# Patient Record
Sex: FEMALE | Race: WHITE | NOT HISPANIC OR LATINO | Employment: OTHER | URBAN - METROPOLITAN AREA
[De-identification: names, ages, dates, MRNs, and addresses within clinical notes are randomized per-mention and may not be internally consistent; named-entity substitution may affect disease eponyms.]

---

## 2017-05-12 ENCOUNTER — HOSPITAL ENCOUNTER (OUTPATIENT)
Dept: GENERAL RADIOLOGY | Facility: HOSPITAL | Age: 62
Discharge: HOME | End: 2017-05-12
Admitting: INTERNAL MEDICINE

## 2017-05-12 ENCOUNTER — TRANSCRIBE ORDERS (OUTPATIENT)
Dept: ADMINISTRATIVE | Facility: HOSPITAL | Age: 62
End: 2017-05-12

## 2017-05-12 ENCOUNTER — LAB (OUTPATIENT)
Dept: LAB | Facility: HOSPITAL | Age: 62
End: 2017-05-12

## 2017-05-12 DIAGNOSIS — J02.9 ACUTE PHARYNGITIS, UNSPECIFIED ETIOLOGY: ICD-10-CM

## 2017-05-12 DIAGNOSIS — R04.2 HEMOPTYSIS: Primary | ICD-10-CM

## 2017-05-12 DIAGNOSIS — J02.9 ACUTE PHARYNGITIS, UNSPECIFIED ETIOLOGY: Primary | ICD-10-CM

## 2017-05-12 DIAGNOSIS — R04.2 HEMOPTYSIS: ICD-10-CM

## 2017-05-12 PROCEDURE — 87070 CULTURE OTHR SPECIMN AEROBIC: CPT | Performed by: INTERNAL MEDICINE

## 2017-05-12 PROCEDURE — 71020 HC CHEST PA AND LATERAL: CPT

## 2017-05-14 LAB — BACTERIA SPEC AEROBE CULT: NORMAL

## 2017-05-19 ENCOUNTER — LAB (OUTPATIENT)
Dept: LAB | Facility: HOSPITAL | Age: 62
End: 2017-05-19

## 2017-05-19 ENCOUNTER — TRANSCRIBE ORDERS (OUTPATIENT)
Dept: ADMINISTRATIVE | Facility: HOSPITAL | Age: 62
End: 2017-05-19

## 2017-05-19 DIAGNOSIS — E78.00 PURE HYPERCHOLESTEROLEMIA: ICD-10-CM

## 2017-05-19 DIAGNOSIS — E73.9 INTESTINAL DISACCHARIDASE DEFICIENCIES AND DISACCHARIDE MALABSORPTION: ICD-10-CM

## 2017-05-19 DIAGNOSIS — I10 ESSENTIAL HYPERTENSION, MALIGNANT: ICD-10-CM

## 2017-05-19 DIAGNOSIS — E73.9 INTESTINAL DISACCHARIDASE DEFICIENCIES AND DISACCHARIDE MALABSORPTION: Primary | ICD-10-CM

## 2017-05-19 LAB
ALBUMIN SERPL-MCNC: 4 G/DL (ref 3.5–5.2)
ALBUMIN/GLOB SERPL: 1.4 G/DL
ALP SERPL-CCNC: 70 U/L (ref 39–117)
ALT SERPL W P-5'-P-CCNC: 15 U/L (ref 1–33)
ANION GAP SERPL CALCULATED.3IONS-SCNC: 8.7 MMOL/L
AST SERPL-CCNC: 17 U/L (ref 1–32)
BILIRUB SERPL-MCNC: 0.3 MG/DL (ref 0.1–1.2)
BUN BLD-MCNC: 19 MG/DL (ref 8–23)
BUN/CREAT SERPL: 21.3 (ref 7–25)
CALCIUM SPEC-SCNC: 9.5 MG/DL (ref 8.6–10.5)
CHLORIDE SERPL-SCNC: 103 MMOL/L (ref 98–107)
CHOLEST SERPL-MCNC: 181 MG/DL (ref 0–200)
CO2 SERPL-SCNC: 29.3 MMOL/L (ref 22–29)
CREAT BLD-MCNC: 0.89 MG/DL (ref 0.57–1)
GFR SERPL CREATININE-BSD FRML MDRD: 64 ML/MIN/1.73
GLOBULIN UR ELPH-MCNC: 2.8 GM/DL
GLUCOSE BLD-MCNC: 87 MG/DL (ref 65–99)
HBA1C MFR BLD: 5.4 % (ref 4.8–5.6)
HDLC SERPL-MCNC: 62 MG/DL (ref 40–60)
LDLC SERPL CALC-MCNC: 108 MG/DL (ref 0–100)
LDLC/HDLC SERPL: 1.74 {RATIO}
POTASSIUM BLD-SCNC: 4.5 MMOL/L (ref 3.5–5.2)
PROT SERPL-MCNC: 6.8 G/DL (ref 6–8.5)
SODIUM BLD-SCNC: 141 MMOL/L (ref 136–145)
TRIGL SERPL-MCNC: 57 MG/DL (ref 0–150)
VLDLC SERPL-MCNC: 11.4 MG/DL (ref 5–40)

## 2017-05-19 PROCEDURE — 80053 COMPREHEN METABOLIC PANEL: CPT | Performed by: INTERNAL MEDICINE

## 2017-05-19 PROCEDURE — 80061 LIPID PANEL: CPT | Performed by: INTERNAL MEDICINE

## 2017-05-19 PROCEDURE — 36415 COLL VENOUS BLD VENIPUNCTURE: CPT

## 2017-05-19 PROCEDURE — 83036 HEMOGLOBIN GLYCOSYLATED A1C: CPT | Performed by: INTERNAL MEDICINE

## 2017-09-20 ENCOUNTER — APPOINTMENT (OUTPATIENT)
Dept: WOMENS IMAGING | Facility: HOSPITAL | Age: 62
End: 2017-09-20

## 2017-09-20 PROCEDURE — 77067 SCR MAMMO BI INCL CAD: CPT | Performed by: RADIOLOGY

## 2017-11-27 ENCOUNTER — LAB (OUTPATIENT)
Dept: LAB | Facility: HOSPITAL | Age: 62
End: 2017-11-27

## 2017-11-27 ENCOUNTER — TRANSCRIBE ORDERS (OUTPATIENT)
Dept: ADMINISTRATIVE | Facility: HOSPITAL | Age: 62
End: 2017-11-27

## 2017-11-27 DIAGNOSIS — I10 ESSENTIAL HYPERTENSION, MALIGNANT: ICD-10-CM

## 2017-11-27 DIAGNOSIS — E78.5 HYPERLIPIDEMIA, UNSPECIFIED HYPERLIPIDEMIA TYPE: ICD-10-CM

## 2017-11-27 DIAGNOSIS — Z00.00 ROUTINE GENERAL MEDICAL EXAMINATION AT A HEALTH CARE FACILITY: ICD-10-CM

## 2017-11-27 DIAGNOSIS — Z00.00 ROUTINE GENERAL MEDICAL EXAMINATION AT A HEALTH CARE FACILITY: Primary | ICD-10-CM

## 2017-11-27 LAB
25(OH)D3 SERPL-MCNC: 34.3 NG/ML (ref 30–100)
ALBUMIN SERPL-MCNC: 4.1 G/DL (ref 3.5–5.2)
ALBUMIN/GLOB SERPL: 1.5 G/DL
ALP SERPL-CCNC: 68 U/L (ref 39–117)
ALT SERPL W P-5'-P-CCNC: 12 U/L (ref 1–33)
ANION GAP SERPL CALCULATED.3IONS-SCNC: 10 MMOL/L
AST SERPL-CCNC: 14 U/L (ref 1–32)
BACTERIA UR QL AUTO: NORMAL /HPF
BASOPHILS # BLD AUTO: 0.03 10*3/MM3 (ref 0–0.2)
BASOPHILS NFR BLD AUTO: 0.4 % (ref 0–1.5)
BILIRUB SERPL-MCNC: 0.3 MG/DL (ref 0.1–1.2)
BILIRUB UR QL STRIP: NEGATIVE
BUN BLD-MCNC: 19 MG/DL (ref 8–23)
BUN/CREAT SERPL: 21.8 (ref 7–25)
CALCIUM SPEC-SCNC: 9.3 MG/DL (ref 8.6–10.5)
CHLORIDE SERPL-SCNC: 103 MMOL/L (ref 98–107)
CHOLEST SERPL-MCNC: 176 MG/DL (ref 0–200)
CLARITY UR: CLEAR
CO2 SERPL-SCNC: 27 MMOL/L (ref 22–29)
COLOR UR: YELLOW
CREAT BLD-MCNC: 0.87 MG/DL (ref 0.57–1)
DEPRECATED RDW RBC AUTO: 41.8 FL (ref 37–54)
EOSINOPHIL # BLD AUTO: 0.24 10*3/MM3 (ref 0–0.7)
EOSINOPHIL NFR BLD AUTO: 3.4 % (ref 0.3–6.2)
ERYTHROCYTE [DISTWIDTH] IN BLOOD BY AUTOMATED COUNT: 12.3 % (ref 11.7–13)
GFR SERPL CREATININE-BSD FRML MDRD: 66 ML/MIN/1.73
GLOBULIN UR ELPH-MCNC: 2.8 GM/DL
GLUCOSE BLD-MCNC: 86 MG/DL (ref 65–99)
GLUCOSE UR STRIP-MCNC: NEGATIVE MG/DL
HCT VFR BLD AUTO: 36.2 % (ref 35.6–45.5)
HDLC SERPL-MCNC: 71 MG/DL (ref 40–60)
HGB BLD-MCNC: 11.7 G/DL (ref 11.9–15.5)
HGB UR QL STRIP.AUTO: ABNORMAL
HYALINE CASTS UR QL AUTO: NORMAL /LPF
IMM GRANULOCYTES # BLD: 0.02 10*3/MM3 (ref 0–0.03)
IMM GRANULOCYTES NFR BLD: 0.3 % (ref 0–0.5)
KETONES UR QL STRIP: NEGATIVE
LDLC SERPL CALC-MCNC: 95 MG/DL (ref 0–100)
LDLC/HDLC SERPL: 1.34 {RATIO}
LEUKOCYTE ESTERASE UR QL STRIP.AUTO: NEGATIVE
LYMPHOCYTES # BLD AUTO: 2.31 10*3/MM3 (ref 0.9–4.8)
LYMPHOCYTES NFR BLD AUTO: 33.2 % (ref 19.6–45.3)
MCH RBC QN AUTO: 30.2 PG (ref 26.9–32)
MCHC RBC AUTO-ENTMCNC: 32.3 G/DL (ref 32.4–36.3)
MCV RBC AUTO: 93.3 FL (ref 80.5–98.2)
MONOCYTES # BLD AUTO: 0.6 10*3/MM3 (ref 0.2–1.2)
MONOCYTES NFR BLD AUTO: 8.6 % (ref 5–12)
NEUTROPHILS # BLD AUTO: 3.76 10*3/MM3 (ref 1.9–8.1)
NEUTROPHILS NFR BLD AUTO: 54.1 % (ref 42.7–76)
NITRITE UR QL STRIP: NEGATIVE
PH UR STRIP.AUTO: 6.5 [PH] (ref 5–8)
PLATELET # BLD AUTO: 301 10*3/MM3 (ref 140–500)
PMV BLD AUTO: 9.8 FL (ref 6–12)
POTASSIUM BLD-SCNC: 4.8 MMOL/L (ref 3.5–5.2)
PROT SERPL-MCNC: 6.9 G/DL (ref 6–8.5)
PROT UR QL STRIP: NEGATIVE
RBC # BLD AUTO: 3.88 10*6/MM3 (ref 3.9–5.2)
RBC # UR: NORMAL /HPF
REF LAB TEST METHOD: NORMAL
SODIUM BLD-SCNC: 140 MMOL/L (ref 136–145)
SP GR UR STRIP: 1.02 (ref 1–1.03)
SQUAMOUS #/AREA URNS HPF: NORMAL /HPF
TRIGL SERPL-MCNC: 50 MG/DL (ref 0–150)
TSH SERPL DL<=0.05 MIU/L-ACNC: 1.55 MIU/ML (ref 0.27–4.2)
UROBILINOGEN UR QL STRIP: ABNORMAL
VLDLC SERPL-MCNC: 10 MG/DL (ref 5–40)
WBC NRBC COR # BLD: 6.96 10*3/MM3 (ref 4.5–10.7)
WBC UR QL AUTO: NORMAL /HPF

## 2017-11-27 PROCEDURE — 85025 COMPLETE CBC W/AUTO DIFF WBC: CPT | Performed by: INTERNAL MEDICINE

## 2017-11-27 PROCEDURE — 36415 COLL VENOUS BLD VENIPUNCTURE: CPT

## 2017-11-27 PROCEDURE — 81001 URINALYSIS AUTO W/SCOPE: CPT | Performed by: INTERNAL MEDICINE

## 2017-11-27 PROCEDURE — 80061 LIPID PANEL: CPT | Performed by: INTERNAL MEDICINE

## 2017-11-27 PROCEDURE — 82306 VITAMIN D 25 HYDROXY: CPT | Performed by: INTERNAL MEDICINE

## 2017-11-27 PROCEDURE — 80053 COMPREHEN METABOLIC PANEL: CPT | Performed by: INTERNAL MEDICINE

## 2017-11-27 PROCEDURE — 84443 ASSAY THYROID STIM HORMONE: CPT | Performed by: INTERNAL MEDICINE

## 2018-02-26 ENCOUNTER — HOSPITAL ENCOUNTER (OUTPATIENT)
Dept: OTHER | Age: 63
Discharge: HOME | End: 2018-02-26

## 2018-02-26 LAB
BASOPHILS ABSOLUTE: 0.1 K/UL (ref 0–0.1)
BASOPHILS RELATIVE PERCENT: 0.7 %
EOSINOPHILS ABSOLUTE: 0.1 K/UL (ref 0–0.4)
EOSINOPHILS RELATIVE PERCENT: 1.4 %
FERRITIN: 140.2 NG/ML (ref 22–322)
FOLATE: 13.6 NG/ML
HCT VFR BLD CALC: 35.1 % (ref 37–47)
HEMOGLOBIN: 11.4 G/DL (ref 11.5–16.5)
IMMATURE GRANULOCYTES #: 0 K/UL
IMMATURE GRANULOCYTES %: 0.1 % (ref 0–5)
IMMATURE RETIC FRACT: 0.37 (ref 0.21–0.37)
LYMPHOCYTES ABSOLUTE: 1.9 K/UL (ref 1.5–4)
LYMPHOCYTES RELATIVE PERCENT: 27.1 %
MCH RBC QN AUTO: 30.2 PG (ref 27–32)
MCHC RBC AUTO-ENTMCNC: 32.5 G/DL (ref 31–35)
MCV RBC AUTO: 92.9 FL (ref 80–100)
MONOCYTES ABSOLUTE: 0.6 K/UL (ref 0.2–0.8)
MONOCYTES RELATIVE PERCENT: 8.1 %
NEUTROPHILS ABSOLUTE: 4.3 K/UL (ref 2–7.5)
NEUTROPHILS RELATIVE PERCENT: 62.6 %
PDW BLD-RTO: 11.9 % (ref 11–16)
PLATELET # BLD: 294 K/UL (ref 150–400)
PMV BLD AUTO: 9.2 FL (ref 6–10)
RBC # BLD: 3.78 M/UL (ref 3.8–5.8)
RETICULOCYTE COUNT PCT: 1.15 % (ref 0.5–2.18)
TOTAL IRON BINDING CAPACITY: 237 UG/DL (ref 250–450)
WBC # BLD: 6.9 K/UL (ref 4–11)

## 2018-02-27 LAB — VITAMIN B-12: 755 PG/ML (ref 211–911)

## 2018-06-04 ENCOUNTER — LAB (OUTPATIENT)
Dept: LAB | Facility: HOSPITAL | Age: 63
End: 2018-06-04

## 2018-06-04 ENCOUNTER — TRANSCRIBE ORDERS (OUTPATIENT)
Dept: ADMINISTRATIVE | Facility: HOSPITAL | Age: 63
End: 2018-06-04

## 2018-06-04 DIAGNOSIS — D64.9 ANEMIA, UNSPECIFIED TYPE: ICD-10-CM

## 2018-06-04 DIAGNOSIS — I10 ESSENTIAL HYPERTENSION, MALIGNANT: ICD-10-CM

## 2018-06-04 DIAGNOSIS — D64.9 ANEMIA, UNSPECIFIED TYPE: Primary | ICD-10-CM

## 2018-06-04 LAB
ANION GAP SERPL CALCULATED.3IONS-SCNC: 10.8 MMOL/L
BASOPHILS # BLD AUTO: 0.03 10*3/MM3 (ref 0–0.2)
BASOPHILS NFR BLD AUTO: 0.4 % (ref 0–1.5)
BUN BLD-MCNC: 15 MG/DL (ref 8–23)
BUN/CREAT SERPL: 14.7 (ref 7–25)
CALCIUM SPEC-SCNC: 10 MG/DL (ref 8.6–10.5)
CHLORIDE SERPL-SCNC: 99 MMOL/L (ref 98–107)
CO2 SERPL-SCNC: 30.2 MMOL/L (ref 22–29)
CREAT BLD-MCNC: 1.02 MG/DL (ref 0.57–1)
DEPRECATED RDW RBC AUTO: 40.4 FL (ref 37–54)
EOSINOPHIL # BLD AUTO: 0.25 10*3/MM3 (ref 0–0.7)
EOSINOPHIL NFR BLD AUTO: 3.5 % (ref 0.3–6.2)
ERYTHROCYTE [DISTWIDTH] IN BLOOD BY AUTOMATED COUNT: 12 % (ref 11.7–13)
GFR SERPL CREATININE-BSD FRML MDRD: 55 ML/MIN/1.73
GLUCOSE BLD-MCNC: 90 MG/DL (ref 65–99)
HCT VFR BLD AUTO: 39.9 % (ref 35.6–45.5)
HGB BLD-MCNC: 13 G/DL (ref 11.9–15.5)
IMM GRANULOCYTES # BLD: 0.02 10*3/MM3 (ref 0–0.03)
IMM GRANULOCYTES NFR BLD: 0.3 % (ref 0–0.5)
LYMPHOCYTES # BLD AUTO: 2.23 10*3/MM3 (ref 0.9–4.8)
LYMPHOCYTES NFR BLD AUTO: 31.2 % (ref 19.6–45.3)
MCH RBC QN AUTO: 30.4 PG (ref 26.9–32)
MCHC RBC AUTO-ENTMCNC: 32.6 G/DL (ref 32.4–36.3)
MCV RBC AUTO: 93.2 FL (ref 80.5–98.2)
MONOCYTES # BLD AUTO: 0.6 10*3/MM3 (ref 0.2–1.2)
MONOCYTES NFR BLD AUTO: 8.4 % (ref 5–12)
NEUTROPHILS # BLD AUTO: 4.04 10*3/MM3 (ref 1.9–8.1)
NEUTROPHILS NFR BLD AUTO: 56.5 % (ref 42.7–76)
PLATELET # BLD AUTO: 306 10*3/MM3 (ref 140–500)
PMV BLD AUTO: 9.7 FL (ref 6–12)
POTASSIUM BLD-SCNC: 4.4 MMOL/L (ref 3.5–5.2)
RBC # BLD AUTO: 4.28 10*6/MM3 (ref 3.9–5.2)
SODIUM BLD-SCNC: 140 MMOL/L (ref 136–145)
WBC NRBC COR # BLD: 7.15 10*3/MM3 (ref 4.5–10.7)

## 2018-06-04 PROCEDURE — 85025 COMPLETE CBC W/AUTO DIFF WBC: CPT | Performed by: INTERNAL MEDICINE

## 2018-06-04 PROCEDURE — 80048 BASIC METABOLIC PNL TOTAL CA: CPT | Performed by: INTERNAL MEDICINE

## 2018-06-04 PROCEDURE — 36415 COLL VENOUS BLD VENIPUNCTURE: CPT

## 2018-07-09 ENCOUNTER — TRANSCRIBE ORDERS (OUTPATIENT)
Dept: ADMINISTRATIVE | Facility: HOSPITAL | Age: 63
End: 2018-07-09

## 2018-07-09 ENCOUNTER — HOSPITAL ENCOUNTER (OUTPATIENT)
Dept: GENERAL RADIOLOGY | Facility: HOSPITAL | Age: 63
Discharge: HOME | End: 2018-07-09
Admitting: INTERNAL MEDICINE

## 2018-07-09 DIAGNOSIS — M54.5 LOW BACK PAIN, UNSPECIFIED BACK PAIN LATERALITY, UNSPECIFIED CHRONICITY, WITH SCIATICA PRESENCE UNSPECIFIED: ICD-10-CM

## 2018-07-09 DIAGNOSIS — M54.10 RADICULOPATHY, UNSPECIFIED SPINAL REGION: ICD-10-CM

## 2018-07-09 DIAGNOSIS — M54.5 LOW BACK PAIN, UNSPECIFIED BACK PAIN LATERALITY, UNSPECIFIED CHRONICITY, WITH SCIATICA PRESENCE UNSPECIFIED: Primary | ICD-10-CM

## 2018-07-09 PROCEDURE — 72110 X-RAY EXAM L-2 SPINE 4/>VWS: CPT

## 2018-09-24 ENCOUNTER — APPOINTMENT (OUTPATIENT)
Dept: WOMENS IMAGING | Facility: HOSPITAL | Age: 63
End: 2018-09-24

## 2018-09-24 PROCEDURE — 77067 SCR MAMMO BI INCL CAD: CPT | Performed by: RADIOLOGY

## 2018-09-24 PROCEDURE — 77063 BREAST TOMOSYNTHESIS BI: CPT | Performed by: RADIOLOGY

## 2018-12-03 ENCOUNTER — LAB (OUTPATIENT)
Dept: LAB | Facility: HOSPITAL | Age: 63
End: 2018-12-03

## 2018-12-03 ENCOUNTER — TRANSCRIBE ORDERS (OUTPATIENT)
Dept: ADMINISTRATIVE | Facility: HOSPITAL | Age: 63
End: 2018-12-03

## 2018-12-03 DIAGNOSIS — I10 ESSENTIAL HYPERTENSION, MALIGNANT: ICD-10-CM

## 2018-12-03 DIAGNOSIS — E55.9 VITAMIN D DEFICIENCY: ICD-10-CM

## 2018-12-03 DIAGNOSIS — R53.83 OTHER FATIGUE: ICD-10-CM

## 2018-12-03 DIAGNOSIS — Z00.00 ROUTINE GENERAL MEDICAL EXAMINATION AT A HEALTH CARE FACILITY: Primary | ICD-10-CM

## 2018-12-03 DIAGNOSIS — Z00.00 ROUTINE GENERAL MEDICAL EXAMINATION AT A HEALTH CARE FACILITY: ICD-10-CM

## 2018-12-03 LAB
25(OH)D3 SERPL-MCNC: 32.6 NG/ML (ref 30–100)
ALBUMIN SERPL-MCNC: 4.2 G/DL (ref 3.5–5.2)
ALBUMIN/GLOB SERPL: 1.8 G/DL
ALP SERPL-CCNC: 77 U/L (ref 39–117)
ALT SERPL W P-5'-P-CCNC: 17 U/L (ref 1–33)
ANION GAP SERPL CALCULATED.3IONS-SCNC: 8.4 MMOL/L
AST SERPL-CCNC: 16 U/L (ref 1–32)
BACTERIA UR QL AUTO: ABNORMAL /HPF
BASOPHILS # BLD AUTO: 0.04 10*3/MM3 (ref 0–0.2)
BASOPHILS NFR BLD AUTO: 0.5 % (ref 0–1.5)
BILIRUB SERPL-MCNC: 0.3 MG/DL (ref 0.1–1.2)
BILIRUB UR QL STRIP: NEGATIVE
BUN BLD-MCNC: 16 MG/DL (ref 8–23)
BUN/CREAT SERPL: 19.5 (ref 7–25)
CALCIUM SPEC-SCNC: 9.4 MG/DL (ref 8.6–10.5)
CHLORIDE SERPL-SCNC: 102 MMOL/L (ref 98–107)
CHOLEST SERPL-MCNC: 194 MG/DL (ref 0–200)
CLARITY UR: CLEAR
CO2 SERPL-SCNC: 27.6 MMOL/L (ref 22–29)
COLOR UR: YELLOW
CREAT BLD-MCNC: 0.82 MG/DL (ref 0.57–1)
DEPRECATED RDW RBC AUTO: 42.2 FL (ref 37–54)
EOSINOPHIL # BLD AUTO: 0.18 10*3/MM3 (ref 0–0.7)
EOSINOPHIL NFR BLD AUTO: 2.4 % (ref 0.3–6.2)
ERYTHROCYTE [DISTWIDTH] IN BLOOD BY AUTOMATED COUNT: 12.2 % (ref 11.7–13)
GFR SERPL CREATININE-BSD FRML MDRD: 70 ML/MIN/1.73
GLOBULIN UR ELPH-MCNC: 2.3 GM/DL
GLUCOSE BLD-MCNC: 92 MG/DL (ref 65–99)
GLUCOSE UR STRIP-MCNC: NEGATIVE MG/DL
HCT VFR BLD AUTO: 37.5 % (ref 35.6–45.5)
HDLC SERPL-MCNC: 64 MG/DL (ref 40–60)
HGB BLD-MCNC: 11.9 G/DL (ref 11.9–15.5)
HGB UR QL STRIP.AUTO: ABNORMAL
HYALINE CASTS UR QL AUTO: ABNORMAL /LPF
IMM GRANULOCYTES # BLD: 0 10*3/MM3 (ref 0–0.03)
IMM GRANULOCYTES NFR BLD: 0 % (ref 0–0.5)
KETONES UR QL STRIP: ABNORMAL
LDLC SERPL CALC-MCNC: 117 MG/DL (ref 0–100)
LDLC/HDLC SERPL: 1.82 {RATIO}
LEUKOCYTE ESTERASE UR QL STRIP.AUTO: NEGATIVE
LYMPHOCYTES # BLD AUTO: 2.37 10*3/MM3 (ref 0.9–4.8)
LYMPHOCYTES NFR BLD AUTO: 31.9 % (ref 19.6–45.3)
MCH RBC QN AUTO: 30.3 PG (ref 26.9–32)
MCHC RBC AUTO-ENTMCNC: 31.7 G/DL (ref 32.4–36.3)
MCV RBC AUTO: 95.4 FL (ref 80.5–98.2)
MONOCYTES # BLD AUTO: 0.6 10*3/MM3 (ref 0.2–1.2)
MONOCYTES NFR BLD AUTO: 8.1 % (ref 5–12)
NEUTROPHILS # BLD AUTO: 4.23 10*3/MM3 (ref 1.9–8.1)
NEUTROPHILS NFR BLD AUTO: 57.1 % (ref 42.7–76)
NITRITE UR QL STRIP: NEGATIVE
PH UR STRIP.AUTO: 5.5 [PH] (ref 5–8)
PLATELET # BLD AUTO: 310 10*3/MM3 (ref 140–500)
PMV BLD AUTO: 9.4 FL (ref 6–12)
POTASSIUM BLD-SCNC: 4.4 MMOL/L (ref 3.5–5.2)
PROT SERPL-MCNC: 6.5 G/DL (ref 6–8.5)
PROT UR QL STRIP: NEGATIVE
RBC # BLD AUTO: 3.93 10*6/MM3 (ref 3.9–5.2)
RBC # UR: ABNORMAL /HPF
REF LAB TEST METHOD: ABNORMAL
SODIUM BLD-SCNC: 138 MMOL/L (ref 136–145)
SP GR UR STRIP: 1.02 (ref 1–1.03)
SQUAMOUS #/AREA URNS HPF: ABNORMAL /HPF
TRIGL SERPL-MCNC: 67 MG/DL (ref 0–150)
TSH SERPL DL<=0.05 MIU/L-ACNC: 1.8 MIU/ML (ref 0.27–4.2)
UROBILINOGEN UR QL STRIP: ABNORMAL
VLDLC SERPL-MCNC: 13.4 MG/DL (ref 5–40)
WBC NRBC COR # BLD: 7.42 10*3/MM3 (ref 4.5–10.7)
WBC UR QL AUTO: ABNORMAL /HPF

## 2018-12-03 PROCEDURE — 84443 ASSAY THYROID STIM HORMONE: CPT | Performed by: INTERNAL MEDICINE

## 2018-12-03 PROCEDURE — 82306 VITAMIN D 25 HYDROXY: CPT | Performed by: INTERNAL MEDICINE

## 2018-12-03 PROCEDURE — 80061 LIPID PANEL: CPT | Performed by: INTERNAL MEDICINE

## 2018-12-03 PROCEDURE — 85025 COMPLETE CBC W/AUTO DIFF WBC: CPT | Performed by: INTERNAL MEDICINE

## 2018-12-03 PROCEDURE — 36415 COLL VENOUS BLD VENIPUNCTURE: CPT

## 2018-12-03 PROCEDURE — 80053 COMPREHEN METABOLIC PANEL: CPT | Performed by: INTERNAL MEDICINE

## 2018-12-03 PROCEDURE — 81001 URINALYSIS AUTO W/SCOPE: CPT | Performed by: INTERNAL MEDICINE

## 2019-05-30 ENCOUNTER — LAB (OUTPATIENT)
Dept: LAB | Facility: HOSPITAL | Age: 64
End: 2019-05-30

## 2019-05-30 ENCOUNTER — TRANSCRIBE ORDERS (OUTPATIENT)
Dept: ADMINISTRATIVE | Facility: HOSPITAL | Age: 64
End: 2019-05-30

## 2019-05-30 DIAGNOSIS — I10 ESSENTIAL HYPERTENSION, MALIGNANT: ICD-10-CM

## 2019-05-30 DIAGNOSIS — E78.5 HYPERLIPIDEMIA, UNSPECIFIED HYPERLIPIDEMIA TYPE: ICD-10-CM

## 2019-05-30 DIAGNOSIS — I10 ESSENTIAL HYPERTENSION, MALIGNANT: Primary | ICD-10-CM

## 2019-05-30 LAB
ALBUMIN SERPL-MCNC: 4.3 G/DL (ref 3.5–5.2)
ALBUMIN/GLOB SERPL: 2 G/DL
ALP SERPL-CCNC: 72 U/L (ref 39–117)
ALT SERPL W P-5'-P-CCNC: 14 U/L (ref 1–33)
ANION GAP SERPL CALCULATED.3IONS-SCNC: 9.1 MMOL/L
AST SERPL-CCNC: 16 U/L (ref 1–32)
BILIRUB SERPL-MCNC: 0.2 MG/DL (ref 0.2–1.2)
BUN BLD-MCNC: 18 MG/DL (ref 8–23)
BUN/CREAT SERPL: 20.2 (ref 7–25)
CALCIUM SPEC-SCNC: 9.4 MG/DL (ref 8.6–10.5)
CHLORIDE SERPL-SCNC: 102 MMOL/L (ref 98–107)
CHOLEST SERPL-MCNC: 195 MG/DL (ref 0–200)
CO2 SERPL-SCNC: 27.9 MMOL/L (ref 22–29)
CREAT BLD-MCNC: 0.89 MG/DL (ref 0.57–1)
GFR SERPL CREATININE-BSD FRML MDRD: 64 ML/MIN/1.73
GLOBULIN UR ELPH-MCNC: 2.2 GM/DL
GLUCOSE BLD-MCNC: 91 MG/DL (ref 65–99)
HDLC SERPL-MCNC: 74 MG/DL (ref 40–60)
LDLC SERPL CALC-MCNC: 110 MG/DL (ref 0–100)
LDLC/HDLC SERPL: 1.49 {RATIO}
POTASSIUM BLD-SCNC: 4.2 MMOL/L (ref 3.5–5.2)
PROT SERPL-MCNC: 6.5 G/DL (ref 6–8.5)
SODIUM BLD-SCNC: 139 MMOL/L (ref 136–145)
TRIGL SERPL-MCNC: 54 MG/DL (ref 0–150)
VLDLC SERPL-MCNC: 10.8 MG/DL (ref 5–40)

## 2019-05-30 PROCEDURE — 36415 COLL VENOUS BLD VENIPUNCTURE: CPT

## 2019-05-30 PROCEDURE — 80053 COMPREHEN METABOLIC PANEL: CPT | Performed by: INTERNAL MEDICINE

## 2019-05-30 PROCEDURE — 80061 LIPID PANEL: CPT | Performed by: INTERNAL MEDICINE

## 2019-06-10 ENCOUNTER — LAB (OUTPATIENT)
Dept: LAB | Facility: HOSPITAL | Age: 64
End: 2019-06-10

## 2019-06-10 ENCOUNTER — TRANSCRIBE ORDERS (OUTPATIENT)
Dept: ADMINISTRATIVE | Facility: HOSPITAL | Age: 64
End: 2019-06-10

## 2019-06-10 DIAGNOSIS — G25.0 ESSENTIAL TREMOR: Primary | ICD-10-CM

## 2019-06-10 DIAGNOSIS — G25.0 ESSENTIAL TREMOR: ICD-10-CM

## 2019-06-10 LAB
T3 SERPL-MCNC: 60.8 NG/DL (ref 80–200)
T4 SERPL-MCNC: 2.95 MCG/DL (ref 4.5–11.7)
TSH SERPL DL<=0.05 MIU/L-ACNC: 1.88 MIU/ML (ref 0.27–4.2)

## 2019-06-10 PROCEDURE — 84436 ASSAY OF TOTAL THYROXINE: CPT | Performed by: INTERNAL MEDICINE

## 2019-06-10 PROCEDURE — 84443 ASSAY THYROID STIM HORMONE: CPT | Performed by: INTERNAL MEDICINE

## 2019-06-10 PROCEDURE — 84480 ASSAY TRIIODOTHYRONINE (T3): CPT | Performed by: INTERNAL MEDICINE

## 2019-06-10 PROCEDURE — 36415 COLL VENOUS BLD VENIPUNCTURE: CPT

## 2019-08-12 ENCOUNTER — LAB (OUTPATIENT)
Dept: LAB | Facility: HOSPITAL | Age: 64
End: 2019-08-12

## 2019-08-12 ENCOUNTER — TRANSCRIBE ORDERS (OUTPATIENT)
Dept: ADMINISTRATIVE | Facility: HOSPITAL | Age: 64
End: 2019-08-12

## 2019-08-12 DIAGNOSIS — E03.9 HYPOTHYROIDISM, UNSPECIFIED TYPE: Primary | ICD-10-CM

## 2019-08-12 DIAGNOSIS — E03.9 HYPOTHYROIDISM, UNSPECIFIED TYPE: ICD-10-CM

## 2019-08-12 LAB
T3 SERPL-MCNC: 62.6 NG/DL (ref 80–200)
T4 SERPL-MCNC: 3.18 MCG/DL (ref 4.5–11.7)
TSH SERPL DL<=0.05 MIU/L-ACNC: 2.23 MIU/ML (ref 0.27–4.2)

## 2019-08-12 PROCEDURE — 36415 COLL VENOUS BLD VENIPUNCTURE: CPT

## 2019-08-12 PROCEDURE — 84436 ASSAY OF TOTAL THYROXINE: CPT | Performed by: INTERNAL MEDICINE

## 2019-08-12 PROCEDURE — 84480 ASSAY TRIIODOTHYRONINE (T3): CPT | Performed by: INTERNAL MEDICINE

## 2019-08-12 PROCEDURE — 84443 ASSAY THYROID STIM HORMONE: CPT | Performed by: INTERNAL MEDICINE

## 2019-10-07 ENCOUNTER — OFFICE VISIT (OUTPATIENT)
Dept: ENDOCRINOLOGY | Age: 64
End: 2019-10-07

## 2019-10-07 VITALS
SYSTOLIC BLOOD PRESSURE: 126 MMHG | DIASTOLIC BLOOD PRESSURE: 74 MMHG | HEART RATE: 66 BPM | BODY MASS INDEX: 32.59 KG/M2 | OXYGEN SATURATION: 94 % | WEIGHT: 166 LBS | HEIGHT: 60 IN

## 2019-10-07 DIAGNOSIS — E03.9 ACQUIRED HYPOTHYROIDISM: Primary | ICD-10-CM

## 2019-10-07 PROCEDURE — 99204 OFFICE O/P NEW MOD 45 MIN: CPT | Performed by: INTERNAL MEDICINE

## 2019-10-07 RX ORDER — FLUTICASONE PROPIONATE 50 MCG
2 SPRAY, SUSPENSION (ML) NASAL DAILY
COMMUNITY

## 2019-10-07 RX ORDER — LANOLIN ALCOHOL/MO/W.PET/CERES
400 CREAM (GRAM) TOPICAL DAILY
COMMUNITY

## 2019-10-07 RX ORDER — IRBESARTAN 75 MG/1
TABLET ORAL
COMMUNITY
Start: 2019-09-28

## 2019-10-07 RX ORDER — AMPICILLIN TRIHYDRATE 250 MG
CAPSULE ORAL
COMMUNITY

## 2019-10-07 RX ORDER — SUMATRIPTAN 100 MG/1
TABLET, FILM COATED ORAL
COMMUNITY
Start: 2019-08-19

## 2019-10-07 RX ORDER — ZOLPIDEM TARTRATE 10 MG/1
TABLET ORAL
COMMUNITY
End: 2020-09-14

## 2019-10-07 RX ORDER — VERAPAMIL HYDROCHLORIDE 240 MG/1
TABLET, FILM COATED, EXTENDED RELEASE ORAL
COMMUNITY
Start: 2019-08-03

## 2019-10-07 RX ORDER — CHLORAL HYDRATE 500 MG
CAPSULE ORAL
COMMUNITY

## 2019-10-07 NOTE — PROGRESS NOTES
Chief Complaint   Patient presents with   • Abnormal Lab   NEW PATIENT APPOINTMENT/  ABNORMAL THYROID LABS    HPI  Aracelis Arechiga,64 y.o. WF is here as a new pt is here as abnormal thyroid labs. Consulted by Dr. Lopez.   Patient's thyroid levels were checked by her primary care physician because of her complaints of tremors.  Patient's TSH levels have been normal for the last 2 to 3 years but when her anti-thyroid panel was checked with total T4 and total T3 her levels were noted to be low.    Repeat levels showed consistent results.    The interim patient also was seen by the neurologist who felt that the tremors were essential tremors.  Which is currently being monitored by the neurologist.  Not on any medication for this.    Today in the clinic patient reports that her energy levels are decent, significant improvement with her sleep getting better on the half a pill of Ambien.  Weight has been stable.  No significant hypo-or hyperthyroid symptoms  Essential tremors.  No family history of thyroid disease that she knows of.  Questionable history of thyroid problem in her brother who passed away.    She is in menopause, no kids of her own.    Reviewed primary care physician's/consulting physician documentation and lab results :     I have reviewed the patient's allergies, medicines, past medical hx, family hx and social hx in detail.    Past Medical History:   Diagnosis Date   • Hypertension    • Insomnia    • Migraines        Family History   Problem Relation Age of Onset   • Macular degeneration Mother    • Heart failure Father        Social History     Socioeconomic History   • Marital status:      Spouse name: Not on file   • Number of children: Not on file   • Years of education: Not on file   • Highest education level: Not on file       No Known Allergies      Current Outpatient Medications:   •  Calcium Carbonate (CALCIUM 600) 1500 (600 Ca) MG tablet, Take 600 mg by mouth Daily., Disp: , Rfl:   •   "Coenzyme Q10 (COQ10) 200 MG capsule, Take  by mouth., Disp: , Rfl:   •  fluticasone (FLONASE) 50 MCG/ACT nasal spray, 2 sprays into the nostril(s) as directed by provider Daily., Disp: , Rfl:   •  folic acid (FOLVITE) 400 MCG tablet, Take 400 mcg by mouth Daily., Disp: , Rfl:   •  irbesartan (AVAPRO) 75 MG tablet, , Disp: , Rfl:   •  Misc Natural Products (LUTEIN 20 PO), Take  by mouth., Disp: , Rfl:   •  Omega-3 Fatty Acids (FISH OIL) 1000 MG capsule capsule, Take  by mouth Daily With Breakfast., Disp: , Rfl:   •  SUMAtriptan (IMITREX) 100 MG tablet, , Disp: , Rfl:   •  verapamil SR (CALAN-SR) 240 MG CR tablet, , Disp: , Rfl:   •  zolpidem (AMBIEN) 10 MG tablet, , Disp: , Rfl:      Review of Systems   Constitutional: Negative for appetite change, fatigue and fever.   Eyes: Negative for visual disturbance.   Respiratory: Negative for shortness of breath.    Cardiovascular: Negative for palpitations and leg swelling.   Gastrointestinal: Negative for abdominal pain and vomiting.   Endocrine: Negative for polydipsia and polyuria.   Musculoskeletal: Negative for joint swelling and neck pain.   Skin: Negative for rash.   Neurological: Negative for weakness and numbness.   Psychiatric/Behavioral: Negative for behavioral problems.       Objective:    /74   Pulse 66   Ht 152.4 cm (60\")   Wt 75.3 kg (166 lb)   SpO2 94%   BMI 32.42 kg/m²     Physical Exam   Constitutional: She is oriented to person, place, and time. She appears well-nourished.   HENT:   Head: Normocephalic and atraumatic.   Eyes: Conjunctivae and EOM are normal. No scleral icterus.   Neck: Normal range of motion. Neck supple. No thyromegaly present.   Cardiovascular: Normal rate and normal heart sounds. Exam reveals no friction rub.   No murmur heard.  Pulmonary/Chest: Effort normal and breath sounds normal. No stridor. She has no wheezes. She has no rales.   Abdominal: Soft. Bowel sounds are normal. She exhibits no distension. There is no " "tenderness.   Musculoskeletal: She exhibits no edema or tenderness.   Lymphadenopathy:     She has no cervical adenopathy.   Neurological: She is alert and oriented to person, place, and time.   Skin: Skin is warm and dry. She is not diaphoretic.   Psychiatric: She has a normal mood and affect.   Vitals reviewed.      Results Review:    I reviewed the patient's new clinical results.    Lab on 08/12/2019   Component Date Value Ref Range Status   • T3, Total 08/12/2019 62.6* 80.0 - 200.0 ng/dl Final   • T4, Total 08/12/2019 3.18* 4.50 - 11.70 mcg/dL Final   • TSH 08/12/2019 2.230  0.270 - 4.200 mIU/mL Final       Aracelis was seen today for abnormal lab.    Diagnoses and all orders for this visit:    Acquired hypothyroidism  -     TSH  -     T4, Free  -     T3, Free  -     Thyroid Peroxidase Antibody  -     TSH; Future  -     T4, Free; Future  -     T3, Free; Future      Hypothyroidism  Levels are abnormal  Explained to the patient that we will check free T4 and free T3 which are more definitive than the total T4 and total T3 which could be influenced by the thyroid binding immunoglobulin and give a falsely low results.    However if the free T4 and free T3 are still low we will proceed with a thyroid replacement for the patient.  Would consider Synthroid  We will repeat the thyroid panel in 6 weeks from now on the medication.    Hyperlipidemia  LDL is elevated  Discussed about statins with the patient, she would want to work on her dietary restrictions at this time.    Fasting blood sugar was within normal limits.  Thank you for asking me to see your patient Aracelis Arechiga in consultation.        Dannie Bryan MD  10/07/19    EMR Dragon / transcription disclaimer:     \"Dictated utilizing Dragon dictation\".         "

## 2019-10-08 LAB
T3FREE SERPL-MCNC: 2.5 PG/ML (ref 2–4.4)
T4 FREE SERPL-MCNC: 0.93 NG/DL (ref 0.93–1.7)
THYROPEROXIDASE AB SERPL-ACNC: 6 IU/ML (ref 0–34)
TSH SERPL DL<=0.005 MIU/L-ACNC: 2.08 UIU/ML (ref 0.27–4.2)

## 2019-10-08 NOTE — PROGRESS NOTES
Please let the patient know that her thyroid levels are absolutely within normal limits.  Free T4 and free T3 are normal.  Would repeat her labs during her next visit.

## 2019-12-05 ENCOUNTER — TRANSCRIBE ORDERS (OUTPATIENT)
Dept: ADMINISTRATIVE | Facility: HOSPITAL | Age: 64
End: 2019-12-05

## 2019-12-05 ENCOUNTER — LAB (OUTPATIENT)
Dept: LAB | Facility: HOSPITAL | Age: 64
End: 2019-12-05

## 2019-12-05 DIAGNOSIS — I10 ESSENTIAL HYPERTENSION, MALIGNANT: ICD-10-CM

## 2019-12-05 DIAGNOSIS — E78.5 HYPERLIPIDEMIA, UNSPECIFIED HYPERLIPIDEMIA TYPE: ICD-10-CM

## 2019-12-05 DIAGNOSIS — Z00.00 ROUTINE GENERAL MEDICAL EXAMINATION AT A HEALTH CARE FACILITY: Primary | ICD-10-CM

## 2019-12-05 DIAGNOSIS — Z00.00 ROUTINE GENERAL MEDICAL EXAMINATION AT A HEALTH CARE FACILITY: ICD-10-CM

## 2019-12-05 LAB
ALBUMIN SERPL-MCNC: 4.3 G/DL (ref 3.5–5.2)
ALBUMIN/GLOB SERPL: 1.6 G/DL
ALP SERPL-CCNC: 87 U/L (ref 39–117)
ALT SERPL W P-5'-P-CCNC: 18 U/L (ref 1–33)
ANION GAP SERPL CALCULATED.3IONS-SCNC: 8.6 MMOL/L (ref 5–15)
AST SERPL-CCNC: 21 U/L (ref 1–32)
BACTERIA UR QL AUTO: ABNORMAL /HPF
BASOPHILS # BLD AUTO: 0.07 10*3/MM3 (ref 0–0.2)
BASOPHILS NFR BLD AUTO: 1 % (ref 0–1.5)
BILIRUB SERPL-MCNC: 0.4 MG/DL (ref 0.2–1.2)
BILIRUB UR QL STRIP: NEGATIVE
BUN BLD-MCNC: 19 MG/DL (ref 8–23)
BUN/CREAT SERPL: 20.7 (ref 7–25)
CALCIUM SPEC-SCNC: 9.4 MG/DL (ref 8.6–10.5)
CHLORIDE SERPL-SCNC: 105 MMOL/L (ref 98–107)
CHOLEST SERPL-MCNC: 186 MG/DL (ref 0–200)
CLARITY UR: CLEAR
CO2 SERPL-SCNC: 29.4 MMOL/L (ref 22–29)
COLOR UR: YELLOW
CREAT BLD-MCNC: 0.92 MG/DL (ref 0.57–1)
DEPRECATED RDW RBC AUTO: 38.6 FL (ref 37–54)
EOSINOPHIL # BLD AUTO: 0.21 10*3/MM3 (ref 0–0.4)
EOSINOPHIL NFR BLD AUTO: 2.9 % (ref 0.3–6.2)
ERYTHROCYTE [DISTWIDTH] IN BLOOD BY AUTOMATED COUNT: 11.7 % (ref 12.3–15.4)
GFR SERPL CREATININE-BSD FRML MDRD: 61 ML/MIN/1.73
GLOBULIN UR ELPH-MCNC: 2.7 GM/DL
GLUCOSE BLD-MCNC: 89 MG/DL (ref 65–99)
GLUCOSE UR STRIP-MCNC: NEGATIVE MG/DL
HCT VFR BLD AUTO: 36.1 % (ref 34–46.6)
HDLC SERPL-MCNC: 66 MG/DL (ref 40–60)
HGB BLD-MCNC: 12.4 G/DL (ref 12–15.9)
HGB UR QL STRIP.AUTO: ABNORMAL
HYALINE CASTS UR QL AUTO: ABNORMAL /LPF
IMM GRANULOCYTES # BLD AUTO: 0.02 10*3/MM3 (ref 0–0.05)
IMM GRANULOCYTES NFR BLD AUTO: 0.3 % (ref 0–0.5)
KETONES UR QL STRIP: NEGATIVE
LDLC SERPL CALC-MCNC: 107 MG/DL (ref 0–100)
LDLC/HDLC SERPL: 1.62 {RATIO}
LEUKOCYTE ESTERASE UR QL STRIP.AUTO: NEGATIVE
LYMPHOCYTES # BLD AUTO: 2.1 10*3/MM3 (ref 0.7–3.1)
LYMPHOCYTES NFR BLD AUTO: 29.5 % (ref 19.6–45.3)
MCH RBC QN AUTO: 31.2 PG (ref 26.6–33)
MCHC RBC AUTO-ENTMCNC: 34.3 G/DL (ref 31.5–35.7)
MCV RBC AUTO: 90.7 FL (ref 79–97)
MONOCYTES # BLD AUTO: 0.62 10*3/MM3 (ref 0.1–0.9)
MONOCYTES NFR BLD AUTO: 8.7 % (ref 5–12)
NEUTROPHILS # BLD AUTO: 4.1 10*3/MM3 (ref 1.7–7)
NEUTROPHILS NFR BLD AUTO: 57.6 % (ref 42.7–76)
NITRITE UR QL STRIP: NEGATIVE
NRBC BLD AUTO-RTO: 0 /100 WBC (ref 0–0.2)
PH UR STRIP.AUTO: 5.5 [PH] (ref 5–8)
PLATELET # BLD AUTO: 322 10*3/MM3 (ref 140–450)
PMV BLD AUTO: 9.5 FL (ref 6–12)
POTASSIUM BLD-SCNC: 4.4 MMOL/L (ref 3.5–5.2)
PROT SERPL-MCNC: 7 G/DL (ref 6–8.5)
PROT UR QL STRIP: NEGATIVE
RBC # BLD AUTO: 3.98 10*6/MM3 (ref 3.77–5.28)
RBC # UR: ABNORMAL /HPF
REF LAB TEST METHOD: ABNORMAL
SODIUM BLD-SCNC: 143 MMOL/L (ref 136–145)
SP GR UR STRIP: 1.03 (ref 1–1.03)
SQUAMOUS #/AREA URNS HPF: ABNORMAL /HPF
TRIGL SERPL-MCNC: 65 MG/DL (ref 0–150)
UROBILINOGEN UR QL STRIP: ABNORMAL
VLDLC SERPL-MCNC: 13 MG/DL
WBC NRBC COR # BLD: 7.12 10*3/MM3 (ref 3.4–10.8)
WBC UR QL AUTO: ABNORMAL /HPF

## 2019-12-05 PROCEDURE — 81001 URINALYSIS AUTO W/SCOPE: CPT | Performed by: INTERNAL MEDICINE

## 2019-12-05 PROCEDURE — 36415 COLL VENOUS BLD VENIPUNCTURE: CPT

## 2019-12-05 PROCEDURE — 85025 COMPLETE CBC W/AUTO DIFF WBC: CPT | Performed by: INTERNAL MEDICINE

## 2019-12-05 PROCEDURE — 80061 LIPID PANEL: CPT | Performed by: INTERNAL MEDICINE

## 2019-12-05 PROCEDURE — 80053 COMPREHEN METABOLIC PANEL: CPT | Performed by: INTERNAL MEDICINE

## 2020-01-05 ENCOUNTER — RESULTS ENCOUNTER (OUTPATIENT)
Dept: ENDOCRINOLOGY | Age: 65
End: 2020-01-05

## 2020-01-05 DIAGNOSIS — E03.9 ACQUIRED HYPOTHYROIDISM: ICD-10-CM

## 2020-02-11 LAB
T3FREE SERPL-MCNC: 2.4 PG/ML (ref 2–4.4)
T4 FREE SERPL-MCNC: 0.84 NG/DL (ref 0.93–1.7)
TSH SERPL DL<=0.005 MIU/L-ACNC: 3.83 UIU/ML (ref 0.27–4.2)

## 2020-02-25 ENCOUNTER — OFFICE VISIT (OUTPATIENT)
Dept: ENDOCRINOLOGY | Age: 65
End: 2020-02-25

## 2020-02-25 VITALS
HEART RATE: 76 BPM | OXYGEN SATURATION: 96 % | WEIGHT: 170 LBS | SYSTOLIC BLOOD PRESSURE: 118 MMHG | BODY MASS INDEX: 33.38 KG/M2 | DIASTOLIC BLOOD PRESSURE: 70 MMHG | HEIGHT: 60 IN

## 2020-02-25 DIAGNOSIS — E03.9 ACQUIRED HYPOTHYROIDISM: Primary | ICD-10-CM

## 2020-02-25 PROCEDURE — 99214 OFFICE O/P EST MOD 30 MIN: CPT | Performed by: INTERNAL MEDICINE

## 2020-02-25 RX ORDER — LEVOTHYROXINE SODIUM 0.03 MG/1
25 TABLET ORAL DAILY
Qty: 30 TABLET | Refills: 11 | Status: SHIPPED | OUTPATIENT
Start: 2020-02-25 | End: 2021-02-24

## 2020-02-25 NOTE — PROGRESS NOTES
64 y.o.    Patient Care Team:  Fransico Lopez MD as PCP - General  Fransico Lopez MD as PCP - Family Medicine    Chief Complaint:    FOLLOW UP/ HYPOTHYROIDISM  Subjective     HPI    Aracelis Arechiga,64 y.o. WF is here as a f/u pt is here as abnormal thyroid labs. Consulted by Dr. Lopez.   Patient's thyroid levels were checked by her primary care physician because of her complaints of tremors.  Patient's TSH levels have been normal for the last 2 to 3 years but when her anti-thyroid panel was checked with total T4 and total T3 her levels were noted to be low.     Today in clinic patient reports that she is not on any thyroid medication.  She does report that her energy levels are low, she gained about 5 to 10 pounds since her last visit with me.  No other significant hyper or hypothyroid symptoms.  She does have essential tremors, was seen by a neurologist.  She opted to be not on any medication at this time as her symptoms were not severe.  Unclear about her family history if there was a thyroid disease.       She is in menopause, no kids of her own.    Reviewed primary care physician's/consulting physician documentation and lab results :     Interval History      The following portions of the patient's history were reviewed and updated as appropriate: allergies, current medications, past family history, past medical history, past social history, past surgical history and problem list.    Past Medical History:   Diagnosis Date   • Hypertension    • Insomnia    • Migraines      Family History   Problem Relation Age of Onset   • Macular degeneration Mother    • Heart failure Father      Social History     Socioeconomic History   • Marital status:      Spouse name: Not on file   • Number of children: Not on file   • Years of education: Not on file   • Highest education level: Not on file   Tobacco Use   • Smoking status: Former Smoker   • Smokeless tobacco: Never Used     No Known Allergies    Current  "Outpatient Medications:   •  Calcium Carbonate (CALCIUM 600) 1500 (600 Ca) MG tablet, Take 600 mg by mouth Daily., Disp: , Rfl:   •  Coenzyme Q10 (COQ10) 200 MG capsule, Take  by mouth., Disp: , Rfl:   •  fluticasone (FLONASE) 50 MCG/ACT nasal spray, 2 sprays into the nostril(s) as directed by provider Daily., Disp: , Rfl:   •  folic acid (FOLVITE) 400 MCG tablet, Take 400 mcg by mouth Daily., Disp: , Rfl:   •  irbesartan (AVAPRO) 75 MG tablet, , Disp: , Rfl:   •  Misc Natural Products (LUTEIN 20 PO), Take  by mouth., Disp: , Rfl:   •  Omega-3 Fatty Acids (FISH OIL) 1000 MG capsule capsule, Take  by mouth Daily With Breakfast., Disp: , Rfl:   •  SUMAtriptan (IMITREX) 100 MG tablet, , Disp: , Rfl:   •  verapamil SR (CALAN-SR) 240 MG CR tablet, , Disp: , Rfl:   •  zolpidem (AMBIEN) 10 MG tablet, , Disp: , Rfl:   •  levothyroxine (SYNTHROID, LEVOTHROID) 25 MCG tablet, Take 1 tablet by mouth Daily., Disp: 30 tablet, Rfl: 11        Review of Systems   Constitutional: Negative for appetite change, fatigue and fever.   Eyes: Negative for visual disturbance.   Respiratory: Negative for shortness of breath.    Cardiovascular: Negative for palpitations and leg swelling.   Gastrointestinal: Negative for abdominal pain and vomiting.   Endocrine: Negative for polydipsia and polyuria.   Musculoskeletal: Negative for joint swelling and neck pain.   Skin: Negative for rash.   Neurological: Negative for weakness and numbness.   Psychiatric/Behavioral: Negative for behavioral problems.     I have reviewed the ROS as documented by the MA; Dannie Bryan MD.      Objective       Vitals:    02/25/20 1126   BP: 118/70   Pulse: 76   SpO2: 96%   Weight: 77.1 kg (170 lb)   Height: 152.4 cm (60\")     Body mass index is 33.2 kg/m².      Physical Exam   Constitutional: She is oriented to person, place, and time. She appears well-nourished.   HENT:   Head: Normocephalic and atraumatic.   Eyes: Conjunctivae and EOM are normal. No scleral " icterus.   Neck: Normal range of motion. Neck supple. No thyromegaly present.   Cardiovascular: Normal rate and normal heart sounds. Exam reveals no friction rub.   No murmur heard.  Pulmonary/Chest: Effort normal and breath sounds normal. No stridor. She has no wheezes. She has no rales.   Abdominal: Soft. Bowel sounds are normal. She exhibits no distension. There is no tenderness.   Musculoskeletal: She exhibits no edema or tenderness.   Lymphadenopathy:     She has no cervical adenopathy.   Neurological: She is alert and oriented to person, place, and time.   Skin: Skin is warm and dry. She is not diaphoretic.   Psychiatric: She has a normal mood and affect.   Vitals reviewed.    Results Review:     I reviewed the patient's new clinical results and mentioned them above in HPI and in plan as well.    Medical records reviewed  Summary:Done      Results Encounter on 01/05/2020   Component Date Value Ref Range Status   • TSH 02/10/2020 3.830  0.270 - 4.200 uIU/mL Final   • Free T4 02/10/2020 0.84* 0.93 - 1.70 ng/dL Final    Results may be falsely increased if patient taking Biotin.   • T3, Free 02/10/2020 2.4  2.0 - 4.4 pg/mL Final     Lab Results   Component Value Date    HGBA1C 5.40 05/19/2017     Lab Results   Component Value Date    MICROALBUR 2.1 11/15/2016    CREATININE 0.92 12/05/2019     Imaging Results (Most Recent)     None                Assessment and Plan:    Aracelis was seen today for hypothyroidism.    Diagnoses and all orders for this visit:    Acquired hypothyroidism  -     TSH; Future  -     T4, Free; Future  -     T3, Free; Future    Other orders  -     levothyroxine (SYNTHROID, LEVOTHROID) 25 MCG tablet; Take 1 tablet by mouth Daily.      Hypothyroidism  Thyroid levels worse  Start levothyroxine 25 mcg oral daily  Repeat blood work-up in few weeks from now.      Obesity  Discussed calorie counting - limit to 1500 silvana per day and exercising 2-3 times a week.  Reviewed Lab results with the patient.  "            Dannie Bryan MD  02/25/20    EMR Dragon / transcription disclaimer:     \"Dictated utilizing Dragon dictation\".  "

## 2020-04-07 ENCOUNTER — RESULTS ENCOUNTER (OUTPATIENT)
Dept: ENDOCRINOLOGY | Age: 65
End: 2020-04-07

## 2020-04-07 DIAGNOSIS — E03.9 ACQUIRED HYPOTHYROIDISM: ICD-10-CM

## 2020-04-07 LAB
T3FREE SERPL-MCNC: 2.3 PG/ML (ref 2–4.4)
T4 FREE SERPL-MCNC: 1.07 NG/DL (ref 0.93–1.7)
TSH SERPL DL<=0.005 MIU/L-ACNC: 1.54 UIU/ML (ref 0.27–4.2)

## 2020-06-04 ENCOUNTER — APPOINTMENT (OUTPATIENT)
Dept: WOMENS IMAGING | Facility: HOSPITAL | Age: 65
End: 2020-06-04

## 2020-06-04 PROCEDURE — 77067 SCR MAMMO BI INCL CAD: CPT | Performed by: RADIOLOGY

## 2020-06-04 PROCEDURE — 77063 BREAST TOMOSYNTHESIS BI: CPT | Performed by: RADIOLOGY

## 2020-06-11 ENCOUNTER — TRANSCRIBE ORDERS (OUTPATIENT)
Dept: ADMINISTRATIVE | Facility: HOSPITAL | Age: 65
End: 2020-06-11

## 2020-06-11 ENCOUNTER — LAB (OUTPATIENT)
Dept: LAB | Facility: HOSPITAL | Age: 65
End: 2020-06-11

## 2020-06-11 DIAGNOSIS — I10 ESSENTIAL HYPERTENSION, MALIGNANT: Primary | ICD-10-CM

## 2020-06-11 DIAGNOSIS — E78.5 HYPERLIPIDEMIA, UNSPECIFIED HYPERLIPIDEMIA TYPE: ICD-10-CM

## 2020-06-11 DIAGNOSIS — I10 ESSENTIAL HYPERTENSION, MALIGNANT: ICD-10-CM

## 2020-06-11 LAB
ALBUMIN SERPL-MCNC: 4.5 G/DL (ref 3.5–5.2)
ALBUMIN/GLOB SERPL: 1.7 G/DL
ALP SERPL-CCNC: 84 U/L (ref 39–117)
ALT SERPL W P-5'-P-CCNC: 15 U/L (ref 1–33)
ANION GAP SERPL CALCULATED.3IONS-SCNC: 9.6 MMOL/L (ref 5–15)
AST SERPL-CCNC: 17 U/L (ref 1–32)
BILIRUB SERPL-MCNC: 0.3 MG/DL (ref 0.2–1.2)
BUN BLD-MCNC: 16 MG/DL (ref 8–23)
BUN/CREAT SERPL: 17.6 (ref 7–25)
CALCIUM SPEC-SCNC: 9.4 MG/DL (ref 8.6–10.5)
CHLORIDE SERPL-SCNC: 102 MMOL/L (ref 98–107)
CHOLEST SERPL-MCNC: 200 MG/DL (ref 0–200)
CO2 SERPL-SCNC: 28.4 MMOL/L (ref 22–29)
CREAT BLD-MCNC: 0.91 MG/DL (ref 0.57–1)
GFR SERPL CREATININE-BSD FRML MDRD: 62 ML/MIN/1.73
GLOBULIN UR ELPH-MCNC: 2.6 GM/DL
GLUCOSE BLD-MCNC: 95 MG/DL (ref 65–99)
HDLC SERPL-MCNC: 66 MG/DL (ref 40–60)
LDLC SERPL CALC-MCNC: 124 MG/DL (ref 0–100)
LDLC/HDLC SERPL: 1.88 {RATIO}
POTASSIUM BLD-SCNC: 4.3 MMOL/L (ref 3.5–5.2)
PROT SERPL-MCNC: 7.1 G/DL (ref 6–8.5)
SODIUM BLD-SCNC: 140 MMOL/L (ref 136–145)
TRIGL SERPL-MCNC: 51 MG/DL (ref 0–150)
VLDLC SERPL-MCNC: 10.2 MG/DL (ref 5–40)

## 2020-06-11 PROCEDURE — 80061 LIPID PANEL: CPT | Performed by: INTERNAL MEDICINE

## 2020-06-11 PROCEDURE — 80053 COMPREHEN METABOLIC PANEL: CPT | Performed by: INTERNAL MEDICINE

## 2020-06-11 PROCEDURE — 36415 COLL VENOUS BLD VENIPUNCTURE: CPT

## 2020-08-27 ENCOUNTER — TRANSCRIBE ORDERS (OUTPATIENT)
Dept: ADMINISTRATIVE | Facility: HOSPITAL | Age: 65
End: 2020-08-27

## 2020-08-27 DIAGNOSIS — M54.50 LOW BACK PAIN, UNSPECIFIED BACK PAIN LATERALITY, UNSPECIFIED CHRONICITY, UNSPECIFIED WHETHER SCIATICA PRESENT: Primary | ICD-10-CM

## 2020-08-31 ENCOUNTER — LAB (OUTPATIENT)
Dept: ENDOCRINOLOGY | Age: 65
End: 2020-08-31

## 2020-08-31 DIAGNOSIS — E05.90 HYPERTHYROIDISM: ICD-10-CM

## 2020-08-31 DIAGNOSIS — IMO0002 DIABETES MELLITUS TYPE 1, UNCONTROLLED, WITH COMPLICATIONS: ICD-10-CM

## 2020-08-31 DIAGNOSIS — IMO0002 DIABETES MELLITUS TYPE 1, UNCONTROLLED, WITH COMPLICATIONS: Primary | ICD-10-CM

## 2020-09-01 LAB
ALBUMIN SERPL-MCNC: 4.6 G/DL (ref 3.5–5.2)
ALBUMIN/CREAT UR: 6 MG/G CREAT (ref 0–29)
ALBUMIN/GLOB SERPL: 2.7 G/DL
ALP SERPL-CCNC: 79 U/L (ref 39–117)
ALT SERPL-CCNC: 19 U/L (ref 1–33)
AST SERPL-CCNC: 18 U/L (ref 1–32)
BILIRUB SERPL-MCNC: 0.4 MG/DL (ref 0–1.2)
BUN SERPL-MCNC: 19 MG/DL (ref 8–23)
BUN/CREAT SERPL: 21.1 (ref 7–25)
CALCIUM SERPL-MCNC: 9.8 MG/DL (ref 8.6–10.5)
CHLORIDE SERPL-SCNC: 100 MMOL/L (ref 98–107)
CHOLEST SERPL-MCNC: 194 MG/DL (ref 0–200)
CO2 SERPL-SCNC: 28.1 MMOL/L (ref 22–29)
CREAT SERPL-MCNC: 0.9 MG/DL (ref 0.57–1)
CREAT UR-MCNC: 78.1 MG/DL
GLOBULIN SER CALC-MCNC: 1.7 GM/DL
GLUCOSE SERPL-MCNC: 92 MG/DL (ref 65–99)
HBA1C MFR BLD: 5.2 % (ref 4.8–5.6)
HDLC SERPL-MCNC: 66 MG/DL (ref 40–60)
INTERPRETATION: NORMAL
LDLC SERPL CALC-MCNC: 112 MG/DL (ref 0–100)
Lab: NORMAL
MICROALBUMIN UR-MCNC: 4.7 UG/ML
POTASSIUM SERPL-SCNC: 4.5 MMOL/L (ref 3.5–5.2)
PROT SERPL-MCNC: 6.3 G/DL (ref 6–8.5)
SODIUM SERPL-SCNC: 138 MMOL/L (ref 136–145)
T3FREE SERPL-MCNC: 2.3 PG/ML (ref 2–4.4)
T4 FREE SERPL-MCNC: 1.4 NG/DL (ref 0.93–1.7)
TRIGL SERPL-MCNC: 78 MG/DL (ref 0–150)
TSH SERPL DL<=0.005 MIU/L-ACNC: 1.12 UIU/ML (ref 0.27–4.2)
VLDLC SERPL CALC-MCNC: 15.6 MG/DL

## 2020-09-06 ENCOUNTER — HOSPITAL ENCOUNTER (OUTPATIENT)
Dept: MRI IMAGING | Facility: HOSPITAL | Age: 65
Discharge: HOME | End: 2020-09-06
Admitting: INTERNAL MEDICINE

## 2020-09-06 DIAGNOSIS — M54.50 LOW BACK PAIN, UNSPECIFIED BACK PAIN LATERALITY, UNSPECIFIED CHRONICITY, UNSPECIFIED WHETHER SCIATICA PRESENT: ICD-10-CM

## 2020-09-06 PROCEDURE — 72148 MRI LUMBAR SPINE W/O DYE: CPT

## 2020-09-14 ENCOUNTER — OFFICE VISIT (OUTPATIENT)
Dept: ENDOCRINOLOGY | Age: 65
End: 2020-09-14

## 2020-09-14 VITALS
SYSTOLIC BLOOD PRESSURE: 120 MMHG | BODY MASS INDEX: 31.06 KG/M2 | HEART RATE: 82 BPM | DIASTOLIC BLOOD PRESSURE: 72 MMHG | WEIGHT: 158.2 LBS | HEIGHT: 60 IN | OXYGEN SATURATION: 98 %

## 2020-09-14 DIAGNOSIS — E78.2 HYPERLIPEMIA, MIXED: ICD-10-CM

## 2020-09-14 DIAGNOSIS — E03.9 ACQUIRED HYPOTHYROIDISM: Primary | ICD-10-CM

## 2020-09-14 DIAGNOSIS — E66.09 CLASS 2 OBESITY DUE TO EXCESS CALORIES WITHOUT SERIOUS COMORBIDITY WITH BODY MASS INDEX (BMI) OF 36.0 TO 36.9 IN ADULT: ICD-10-CM

## 2020-09-14 PROCEDURE — 99214 OFFICE O/P EST MOD 30 MIN: CPT | Performed by: INTERNAL MEDICINE

## 2020-09-14 NOTE — PROGRESS NOTES
65 y.o.    Patient Care Team:  Fransico Lopez MD as PCP - General  Fransico Lopez MD as PCP - Family Medicine    Chief Complaint:    FOLLOW UP/ HYPOTHYROIDISM  Subjective     HPI        Aracelis Arechiga,65 y.o. WF is here as a f/u pt is here as abnormal thyroid labs. Consulted by Dr. Lopez.   Patient's thyroid levels were checked by her primary care physician because of her complaints of tremors.  Patient's TSH levels have been normal for the last 2 to 3 years but when her anti-thyroid panel was checked with total T4 and total T3 her levels were noted to be low.    Today in clinic patient reports that she is on levothyroxine 25 mcg oral daily.  She does report that her energy levels are decent, she has lost about 10 pounds of weight since last visit.  She also has been following weight watchers and as a result she lost some weight.  No other significant hyper or hypothyroid symptoms.  She does have essential tremors, was seen by a neurologist.  She opted to be not on any medication at this time as her symptoms were not severe.  Unclear about her family history if there was a thyroid disease.        She is in menopause, no kids of her own.        Reviewed primary care physician's/consulting physician documentation and lab results :     Interval History      The following portions of the patient's history were reviewed and updated as appropriate: allergies, current medications, past family history, past medical history, past social history, past surgical history and problem list.    Past Medical History:   Diagnosis Date   • Hypertension    • Insomnia    • Migraines      Family History   Problem Relation Age of Onset   • Macular degeneration Mother    • Heart failure Father      Social History     Socioeconomic History   • Marital status:      Spouse name: Not on file   • Number of children: Not on file   • Years of education: Not on file   • Highest education level: Not on file   Tobacco Use   • Smoking  "status: Former Smoker   • Smokeless tobacco: Never Used     No Known Allergies    Current Outpatient Medications:   •  Calcium Carbonate (CALCIUM 600) 1500 (600 Ca) MG tablet, Take 600 mg by mouth Daily., Disp: , Rfl:   •  Coenzyme Q10 (COQ10) 200 MG capsule, Take  by mouth., Disp: , Rfl:   •  fluticasone (FLONASE) 50 MCG/ACT nasal spray, 2 sprays into the nostril(s) as directed by provider Daily., Disp: , Rfl:   •  folic acid (FOLVITE) 400 MCG tablet, Take 400 mcg by mouth Daily., Disp: , Rfl:   •  irbesartan (AVAPRO) 75 MG tablet, , Disp: , Rfl:   •  levothyroxine (SYNTHROID, LEVOTHROID) 25 MCG tablet, Take 1 tablet by mouth Daily., Disp: 30 tablet, Rfl: 11  •  Misc Natural Products (LUTEIN 20 PO), Take  by mouth., Disp: , Rfl:   •  Omega-3 Fatty Acids (FISH OIL) 1000 MG capsule capsule, Take  by mouth Daily With Breakfast., Disp: , Rfl:   •  SUMAtriptan (IMITREX) 100 MG tablet, , Disp: , Rfl:   •  verapamil SR (CALAN-SR) 240 MG CR tablet, , Disp: , Rfl:         Review of Systems   Constitutional: Negative for appetite change, fatigue and fever.   HENT: Negative for trouble swallowing.    Eyes: Negative for visual disturbance.   Respiratory: Negative for shortness of breath.    Cardiovascular: Negative for palpitations and leg swelling.   Gastrointestinal: Negative for abdominal pain and vomiting.   Endocrine: Negative for polydipsia and polyuria.   Musculoskeletal: Negative for joint swelling and neck pain.   Skin: Negative for rash.   Neurological: Negative for weakness and numbness.   Psychiatric/Behavioral: Negative for behavioral problems.     I have reviewed the ROS as documented by the MA; Dannie Bryan MD.      Objective       Vitals:    09/14/20 1006   BP: 120/72   Pulse: 82   SpO2: 98%   Weight: 71.8 kg (158 lb 3.2 oz)   Height: 152.4 cm (60\")     Body mass index is 30.9 kg/m².      Physical Exam  Vitals signs reviewed.   Constitutional:       Appearance: She is not diaphoretic.   HENT:      Head: " Normocephalic and atraumatic.   Eyes:      General: No scleral icterus.     Conjunctiva/sclera: Conjunctivae normal.   Neck:      Musculoskeletal: Normal range of motion and neck supple.      Thyroid: No thyromegaly.   Cardiovascular:      Rate and Rhythm: Normal rate.      Heart sounds: Normal heart sounds. No murmur. No friction rub.   Pulmonary:      Effort: Pulmonary effort is normal.      Breath sounds: Normal breath sounds. No stridor. No wheezing or rales.   Abdominal:      General: Bowel sounds are normal. There is no distension.      Palpations: Abdomen is soft.      Tenderness: There is no abdominal tenderness.   Musculoskeletal:         General: No tenderness.   Lymphadenopathy:      Cervical: No cervical adenopathy.   Skin:     General: Skin is warm and dry.   Neurological:      Mental Status: She is alert and oriented to person, place, and time.     Results Review:     I reviewed the patient's new clinical results and mentioned them above in HPI and in plan as well.    Medical records reviewed  Summary:Done      Lab on 08/31/2020   Component Date Value Ref Range Status   • Total Cholesterol 08/31/2020 194  0 - 200 mg/dL Final   • Triglycerides 08/31/2020 78  0 - 150 mg/dL Final   • HDL Cholesterol 08/31/2020 66* 40 - 60 mg/dL Final   • VLDL Cholesterol Jose David 08/31/2020 15.6  mg/dL Final   • LDL Chol Calc ([facility]) 08/31/2020 112* 0 - 100 mg/dL Final   • Glucose 08/31/2020 92  65 - 99 mg/dL Final   • BUN 08/31/2020 19  8 - 23 mg/dL Final   • Creatinine 08/31/2020 0.90  0.57 - 1.00 mg/dL Final   • eGFR Non African Am 08/31/2020 62  >60 mL/min/1.73 Final   • eGFR African Am 08/31/2020 76  >60 mL/min/1.73 Final   • BUN/Creatinine Ratio 08/31/2020 21.1  7.0 - 25.0 Final   • Sodium 08/31/2020 138  136 - 145 mmol/L Final   • Potassium 08/31/2020 4.5  3.5 - 5.2 mmol/L Final   • Chloride 08/31/2020 100  98 - 107 mmol/L Final   • Total CO2 08/31/2020 28.1  22.0 - 29.0 mmol/L Final   • Calcium 08/31/2020 9.8  8.6 - 10.5  mg/dL Final   • Total Protein 08/31/2020 6.3  6.0 - 8.5 g/dL Final   • Albumin 08/31/2020 4.60  3.50 - 5.20 g/dL Final   • Globulin 08/31/2020 1.7  gm/dL Final   • A/G Ratio 08/31/2020 2.7  g/dL Final   • Total Bilirubin 08/31/2020 0.4  0.0 - 1.2 mg/dL Final   • Alkaline Phosphatase 08/31/2020 79  39 - 117 U/L Final   • AST (SGOT) 08/31/2020 18  1 - 32 U/L Final   • ALT (SGPT) 08/31/2020 19  1 - 33 U/L Final   • Hemoglobin A1C 08/31/2020 5.20  4.80 - 5.60 % Final    Comment: Hemoglobin A1C Ranges:  Increased Risk for Diabetes  5.7% to 6.4%  Diabetes                     >= 6.5%  Diabetic Goal                < 7.0%     • Creatinine, Urine 08/31/2020 78.1  Not Estab. mg/dL Final   • Microalbumin, Urine 08/31/2020 4.7  Not Estab. ug/mL Final   • Microalbumin/Creatinine Ratio 08/31/2020 6  0 - 29 mg/g creat Final    Comment:                        Normal:                0 -  29                         Moderately increased: 30 - 300                         Severely increased:       >300                **Please note reference interval change**     • TSH 08/31/2020 1.120  0.270 - 4.200 uIU/mL Final   • T3, Free 08/31/2020 2.3  2.0 - 4.4 pg/mL Final   • Free T4 08/31/2020 1.40  0.93 - 1.70 ng/dL Final    Results may be falsely increased if patient taking Biotin.   • Interpretation 08/31/2020 Note   Final    Supplemental report is available.   • PDF Image 08/31/2020 Not applicable   Final     Lab Results   Component Value Date    HGBA1C 5.20 08/31/2020    HGBA1C 5.40 05/19/2017     Lab Results   Component Value Date    MICROALBUR 4.7 08/31/2020    CREATININE 0.90 08/31/2020     Imaging Results (Most Recent)     None                Assessment and Plan:    Aracelis was seen today for hypothyroidism.    Diagnoses and all orders for this visit:    Acquired hypothyroidism  -     TSH; Future  -     T4, Free; Future  -     Creatinine, Serum; Future  -     eGFR-Glomerular Filtration; Future  -     Lipid Panel; Future  -     Vitamin B12 &  "Folate; Future  -     Vitamin D 25 Hydroxy; Future    Hyperlipemia, mixed  -     TSH; Future  -     T4, Free; Future  -     Creatinine, Serum; Future  -     eGFR-Glomerular Filtration; Future  -     Lipid Panel; Future  -     Vitamin B12 & Folate; Future  -     Vitamin D 25 Hydroxy; Future    Class 2 obesity due to excess calories without serious comorbidity with body mass index (BMI) of 36.0 to 36.9 in adult  -     TSH; Future  -     T4, Free; Future  -     Creatinine, Serum; Future  -     eGFR-Glomerular Filtration; Future  -     Lipid Panel; Future  -     Vitamin B12 & Folate; Future  -     Vitamin D 25 Hydroxy; Future      Hypothyroidism  Continue levothyroxine 25 mcg oral daily  Emphasized the patient to take the medication on empty stomach.    Osteopenia  We will try to get the records from the OB/GYN.    Hyperlipidemia  Discussed with the patient benefits of statins.  She wants to discuss with Dr. Lopez before she proceeds with the medication.    Reviewed Lab results with the patient.           Dannie Bryan MD  09/14/20    EMR Dragon / transcription disclaimer:     \"Dictated utilizing Dragon dictation\".      "

## 2020-09-17 ENCOUNTER — TELEPHONE (OUTPATIENT)
Dept: ENDOCRINOLOGY | Age: 65
End: 2020-09-17

## 2020-09-17 NOTE — TELEPHONE ENCOUNTER
LEFT VOICE MAIL FOR THE OFFICE OF Ruby Oshea  TO CALL TO GET DEXA SCAN FOR PATIENT SENT TO OUR OFFICE        ----- Message from Dannie Bryan MD sent at 9/14/2020 11:17 AM EDT -----  Can you send in a message to Dr. Oshea's office, Ruby Oshea for her bone density scan.

## 2020-12-10 ENCOUNTER — LAB (OUTPATIENT)
Dept: LAB | Facility: HOSPITAL | Age: 65
End: 2020-12-10

## 2020-12-10 ENCOUNTER — TRANSCRIBE ORDERS (OUTPATIENT)
Dept: ADMINISTRATIVE | Facility: HOSPITAL | Age: 65
End: 2020-12-10

## 2020-12-10 DIAGNOSIS — Z00.00 ROUTINE GENERAL MEDICAL EXAMINATION AT A HEALTH CARE FACILITY: Primary | ICD-10-CM

## 2020-12-10 DIAGNOSIS — I10 ESSENTIAL HYPERTENSION, MALIGNANT: ICD-10-CM

## 2020-12-10 DIAGNOSIS — E78.5 HYPERLIPIDEMIA, UNSPECIFIED HYPERLIPIDEMIA TYPE: ICD-10-CM

## 2020-12-10 DIAGNOSIS — Z00.00 ROUTINE GENERAL MEDICAL EXAMINATION AT A HEALTH CARE FACILITY: ICD-10-CM

## 2020-12-10 LAB
ALBUMIN SERPL-MCNC: 4.2 G/DL (ref 3.5–5.2)
ALBUMIN/GLOB SERPL: 2 G/DL
ALP SERPL-CCNC: 86 U/L (ref 39–117)
ALT SERPL W P-5'-P-CCNC: 14 U/L (ref 1–33)
ANION GAP SERPL CALCULATED.3IONS-SCNC: 7.6 MMOL/L (ref 5–15)
AST SERPL-CCNC: 15 U/L (ref 1–32)
BACTERIA UR QL AUTO: ABNORMAL /HPF
BASOPHILS # BLD AUTO: 0.06 10*3/MM3 (ref 0–0.2)
BASOPHILS NFR BLD AUTO: 0.8 % (ref 0–1.5)
BILIRUB SERPL-MCNC: 0.3 MG/DL (ref 0–1.2)
BILIRUB UR QL STRIP: NEGATIVE
BUN SERPL-MCNC: 14 MG/DL (ref 8–23)
BUN/CREAT SERPL: 14.6 (ref 7–25)
CALCIUM SPEC-SCNC: 9.5 MG/DL (ref 8.6–10.5)
CHLORIDE SERPL-SCNC: 103 MMOL/L (ref 98–107)
CHOLEST SERPL-MCNC: 215 MG/DL (ref 0–200)
CLARITY UR: CLEAR
CO2 SERPL-SCNC: 29.4 MMOL/L (ref 22–29)
COLOR UR: YELLOW
CREAT SERPL-MCNC: 0.96 MG/DL (ref 0.57–1)
DEPRECATED RDW RBC AUTO: 38 FL (ref 37–54)
EOSINOPHIL # BLD AUTO: 0.2 10*3/MM3 (ref 0–0.4)
EOSINOPHIL NFR BLD AUTO: 2.6 % (ref 0.3–6.2)
ERYTHROCYTE [DISTWIDTH] IN BLOOD BY AUTOMATED COUNT: 11.6 % (ref 12.3–15.4)
GFR SERPL CREATININE-BSD FRML MDRD: 58 ML/MIN/1.73
GLOBULIN UR ELPH-MCNC: 2.1 GM/DL
GLUCOSE SERPL-MCNC: 83 MG/DL (ref 65–99)
GLUCOSE UR STRIP-MCNC: NEGATIVE MG/DL
HCT VFR BLD AUTO: 37.4 % (ref 34–46.6)
HDLC SERPL-MCNC: 70 MG/DL (ref 40–60)
HGB BLD-MCNC: 12.5 G/DL (ref 12–15.9)
HGB UR QL STRIP.AUTO: ABNORMAL
HYALINE CASTS UR QL AUTO: ABNORMAL /LPF
IMM GRANULOCYTES # BLD AUTO: 0.03 10*3/MM3 (ref 0–0.05)
IMM GRANULOCYTES NFR BLD AUTO: 0.4 % (ref 0–0.5)
KETONES UR QL STRIP: NEGATIVE
LDLC SERPL CALC-MCNC: 132 MG/DL (ref 0–100)
LDLC/HDLC SERPL: 1.87 {RATIO}
LEUKOCYTE ESTERASE UR QL STRIP.AUTO: NEGATIVE
LYMPHOCYTES # BLD AUTO: 2.41 10*3/MM3 (ref 0.7–3.1)
LYMPHOCYTES NFR BLD AUTO: 31.3 % (ref 19.6–45.3)
MCH RBC QN AUTO: 30.3 PG (ref 26.6–33)
MCHC RBC AUTO-ENTMCNC: 33.4 G/DL (ref 31.5–35.7)
MCV RBC AUTO: 90.8 FL (ref 79–97)
MONOCYTES # BLD AUTO: 0.72 10*3/MM3 (ref 0.1–0.9)
MONOCYTES NFR BLD AUTO: 9.4 % (ref 5–12)
NEUTROPHILS NFR BLD AUTO: 4.28 10*3/MM3 (ref 1.7–7)
NEUTROPHILS NFR BLD AUTO: 55.5 % (ref 42.7–76)
NITRITE UR QL STRIP: NEGATIVE
NRBC BLD AUTO-RTO: 0 /100 WBC (ref 0–0.2)
PH UR STRIP.AUTO: 6 [PH] (ref 5–8)
PLATELET # BLD AUTO: 325 10*3/MM3 (ref 140–450)
PMV BLD AUTO: 9.6 FL (ref 6–12)
POTASSIUM SERPL-SCNC: 4.5 MMOL/L (ref 3.5–5.2)
PROT SERPL-MCNC: 6.3 G/DL (ref 6–8.5)
PROT UR QL STRIP: NEGATIVE
RBC # BLD AUTO: 4.12 10*6/MM3 (ref 3.77–5.28)
RBC # UR: ABNORMAL /HPF
REF LAB TEST METHOD: ABNORMAL
SODIUM SERPL-SCNC: 140 MMOL/L (ref 136–145)
SP GR UR STRIP: 1.02 (ref 1–1.03)
SQUAMOUS #/AREA URNS HPF: ABNORMAL /HPF
TRIGL SERPL-MCNC: 71 MG/DL (ref 0–150)
UROBILINOGEN UR QL STRIP: ABNORMAL
VLDLC SERPL-MCNC: 13 MG/DL (ref 5–40)
WBC # BLD AUTO: 7.7 10*3/MM3 (ref 3.4–10.8)
WBC UR QL AUTO: ABNORMAL /HPF

## 2020-12-10 PROCEDURE — 80061 LIPID PANEL: CPT

## 2020-12-10 PROCEDURE — 80053 COMPREHEN METABOLIC PANEL: CPT

## 2020-12-10 PROCEDURE — 81001 URINALYSIS AUTO W/SCOPE: CPT

## 2020-12-10 PROCEDURE — 36415 COLL VENOUS BLD VENIPUNCTURE: CPT

## 2020-12-10 PROCEDURE — 85025 COMPLETE CBC W/AUTO DIFF WBC: CPT

## 2021-02-24 RX ORDER — LEVOTHYROXINE SODIUM 0.03 MG/1
TABLET ORAL
Qty: 30 TABLET | Refills: 5 | Status: SHIPPED | OUTPATIENT
Start: 2021-02-24 | End: 2021-08-30

## 2021-03-11 ENCOUNTER — LAB (OUTPATIENT)
Dept: LAB | Facility: HOSPITAL | Age: 66
End: 2021-03-11

## 2021-03-11 ENCOUNTER — TRANSCRIBE ORDERS (OUTPATIENT)
Dept: ADMINISTRATIVE | Facility: HOSPITAL | Age: 66
End: 2021-03-11

## 2021-03-11 DIAGNOSIS — E78.5 HYPERLIPIDEMIA, UNSPECIFIED HYPERLIPIDEMIA TYPE: ICD-10-CM

## 2021-03-11 DIAGNOSIS — E78.5 HYPERLIPIDEMIA, UNSPECIFIED HYPERLIPIDEMIA TYPE: Primary | ICD-10-CM

## 2021-03-11 LAB
ALBUMIN SERPL-MCNC: 4.3 G/DL (ref 3.5–5.2)
ALP SERPL-CCNC: 79 U/L (ref 39–117)
ALT SERPL W P-5'-P-CCNC: 12 U/L (ref 1–33)
AST SERPL-CCNC: 17 U/L (ref 1–32)
BILIRUB CONJ SERPL-MCNC: <0.2 MG/DL (ref 0–0.3)
BILIRUB INDIRECT SERPL-MCNC: NORMAL MG/DL
BILIRUB SERPL-MCNC: 0.3 MG/DL (ref 0–1.2)
CHOLEST SERPL-MCNC: 133 MG/DL (ref 0–200)
HDLC SERPL-MCNC: 71 MG/DL (ref 40–60)
LDLC SERPL CALC-MCNC: 51 MG/DL (ref 0–100)
LDLC/HDLC SERPL: 0.74 {RATIO}
PROT SERPL-MCNC: 6.9 G/DL (ref 6–8.5)
TRIGL SERPL-MCNC: 47 MG/DL (ref 0–150)
VLDLC SERPL-MCNC: 11 MG/DL (ref 5–40)

## 2021-03-11 PROCEDURE — 80076 HEPATIC FUNCTION PANEL: CPT

## 2021-03-11 PROCEDURE — 36415 COLL VENOUS BLD VENIPUNCTURE: CPT

## 2021-03-11 PROCEDURE — 80061 LIPID PANEL: CPT

## 2021-03-19 ENCOUNTER — BULK ORDERING (OUTPATIENT)
Dept: CASE MANAGEMENT | Facility: OTHER | Age: 66
End: 2021-03-19

## 2021-03-19 DIAGNOSIS — Z23 IMMUNIZATION DUE: ICD-10-CM

## 2021-04-16 ENCOUNTER — OFFICE VISIT (OUTPATIENT)
Dept: ENDOCRINOLOGY | Age: 66
End: 2021-04-16

## 2021-04-16 VITALS
OXYGEN SATURATION: 98 % | WEIGHT: 136.4 LBS | HEART RATE: 65 BPM | SYSTOLIC BLOOD PRESSURE: 152 MMHG | BODY MASS INDEX: 26.78 KG/M2 | DIASTOLIC BLOOD PRESSURE: 80 MMHG | HEIGHT: 60 IN

## 2021-04-16 DIAGNOSIS — E78.2 HYPERLIPEMIA, MIXED: ICD-10-CM

## 2021-04-16 DIAGNOSIS — E03.9 ACQUIRED HYPOTHYROIDISM: Primary | ICD-10-CM

## 2021-04-16 PROCEDURE — 99214 OFFICE O/P EST MOD 30 MIN: CPT | Performed by: INTERNAL MEDICINE

## 2021-04-16 RX ORDER — ROSUVASTATIN CALCIUM 5 MG/1
5 TABLET, COATED ORAL DAILY
COMMUNITY

## 2021-04-16 NOTE — PROGRESS NOTES
"Chief Complaint  Chief Complaint   Patient presents with   • Hypothyroidism   • Hypertension       Subjective          History of Present Illness    Aracelis Arechiga 65 y.o. presents as a new patient for the evaluation of Hypothyroidism. Consulted by     She reports that her energy levels are ok, weight has been stable, some constipation at times but has remained stable, some hair loss on one side, sleep is fair.   c/o heat intolerance and no cold intolerance. c/o tremors, no racing of heart and no eye symptoms.   Denied c/o difficulty breathing, swallowing and change in voice.   No family hx of thyroid disease.     Hyperlipidemia  On Crestor 5 mg oral daily    Reviewed primary care physician's/consulting physician documentation and lab results         I have reviewed the patient's allergies, medicines, past medical hx, family hx and social hx in detail.    Objective   Vital Signs:   /80 (BP Location: Left arm, Patient Position: Sitting, Cuff Size: Large Adult)   Pulse 65   Ht 152.4 cm (60\")   Wt 61.9 kg (136 lb 6.4 oz)   SpO2 98%   BMI 26.64 kg/m²   Physical Exam   General appearance - no distress  Eyes- anicteric sclera  Ear nose and throat-external ears and nose normal.    Respiratory-normal chest on inspection.  No respiratory distress noted.  Skin-no rashes.  Neuro-alert and oriented x3          Result Review :   The following data was reviewed by: Dannie Bryan MD on 04/16/2021:  Results Encounter on 03/13/2021   Component Date Value Ref Range Status   • TSH 04/02/2021 1.060  0.450 - 4.500 uIU/mL Final   • Free T4 04/02/2021 1.11  0.82 - 1.77 ng/dL Final   • Creatinine 04/02/2021 0.79  0.57 - 1.00 mg/dL Final   • eGFR Non African Am 04/02/2021 78  >59 mL/min/1.73 Final   • eGFR African Am 04/02/2021 90  >59 mL/min/1.73 Final   • Total Cholesterol 04/02/2021 151  100 - 199 mg/dL Final   • Triglycerides 04/02/2021 51  0 - 149 mg/dL Final   • HDL Cholesterol 04/02/2021 73  >39 mg/dL Final   • VLDL " Cholesterol Jose David 04/02/2021 11  5 - 40 mg/dL Final   • LDL Chol Calc ([facility]) 04/02/2021 67  0 - 99 mg/dL Final   • Vitamin B-12 04/02/2021 309  232 - 1,245 pg/mL Final   • Folate 04/02/2021 >20.0  >3.0 ng/mL Final    Comment: A serum folate concentration of less than 3.1 ng/mL is  considered to represent clinical deficiency.     • 25 Hydroxy, Vitamin D 04/02/2021 47.5  30.0 - 100.0 ng/mL Final    Comment: Vitamin D deficiency has been defined by the [place] of  [employer]icine and an Endocrine Society practice guideline as a  level of serum 25-OH vitamin D less than 20 ng/mL (1,2).  The Endocrine Society went on to further define vitamin D  insufficiency as a level between 21 and 29 ng/mL (2).  1. IOM ([place] of [employer]). 2010. Dietary reference     intakes for calcium and D. [place]: The     National Academies Press.  2. Klever MF, Mu DUMONT, Jeffy ACUNA, et al.     Evaluation, treatment, and prevention of vitamin D     deficiency: an Endocrine Society clinical practice     guideline. JCEM. 2011 Jul; 96(7):1911-30.     • Interpretation 04/02/2021 Note   Final    Supplemental report is available.     Data reviewed: PCP documentation       Results Review:    I reviewed the patient's new clinical results.     Assessment and Plan    Problem List Items Addressed This Visit     None      Visit Diagnoses     Acquired hypothyroidism    -  Primary    Hyperlipemia, mixed        Relevant Medications    rosuvastatin (CRESTOR) 5 MG tablet        Hypothyroidism - chronic problem   Continue levothyroxine 25 mcg oral daily  Noted that thyroid levels have been stable    Hyperlipidemia - chronic problem  Continue Crestor 5 mg oral daily.    Patient will be released back to the primary care physician.  Please touch base with us if there are any further questions.    Interpreted the blood work-up/imaging results performed by the primary care/consulting physician -    Refills sent to pharmacy    Follow Up     Patient was  "given instructions and counseling regarding her condition or for health maintenance advice. Please see specific information pulled into the AVS if appropriate.       Thank you for asking me to see your patient, Aracelis Arechiga in consultation.         Dannie Bryan MD  04/16/21      EMR Dragon / transcription disclaimer:     \"Dictated utilizing Dragon dictation\".              "

## 2021-06-10 ENCOUNTER — APPOINTMENT (OUTPATIENT)
Dept: WOMENS IMAGING | Facility: HOSPITAL | Age: 66
End: 2021-06-10

## 2021-06-10 PROCEDURE — 77063 BREAST TOMOSYNTHESIS BI: CPT | Performed by: RADIOLOGY

## 2021-06-10 PROCEDURE — 77067 SCR MAMMO BI INCL CAD: CPT | Performed by: RADIOLOGY

## 2021-08-30 RX ORDER — LEVOTHYROXINE SODIUM 0.03 MG/1
TABLET ORAL
Qty: 90 TABLET | Refills: 0 | Status: SHIPPED | OUTPATIENT
Start: 2021-08-30 | End: 2021-12-03

## 2021-09-15 ENCOUNTER — TRANSCRIBE ORDERS (OUTPATIENT)
Dept: ADMINISTRATIVE | Facility: HOSPITAL | Age: 66
End: 2021-09-15

## 2021-09-15 ENCOUNTER — LAB (OUTPATIENT)
Dept: LAB | Facility: HOSPITAL | Age: 66
End: 2021-09-15

## 2021-09-15 DIAGNOSIS — E78.5 HYPERLIPIDEMIA, UNSPECIFIED HYPERLIPIDEMIA TYPE: ICD-10-CM

## 2021-09-15 DIAGNOSIS — I10 ESSENTIAL HYPERTENSION, MALIGNANT: ICD-10-CM

## 2021-09-15 DIAGNOSIS — E78.5 HYPERLIPIDEMIA, UNSPECIFIED HYPERLIPIDEMIA TYPE: Primary | ICD-10-CM

## 2021-09-15 LAB
ALBUMIN SERPL-MCNC: 4.2 G/DL (ref 3.5–5.2)
ALBUMIN/GLOB SERPL: 1.9 G/DL
ALP SERPL-CCNC: 73 U/L (ref 39–117)
ALT SERPL W P-5'-P-CCNC: 26 U/L (ref 1–33)
ANION GAP SERPL CALCULATED.3IONS-SCNC: 8 MMOL/L (ref 5–15)
AST SERPL-CCNC: 28 U/L (ref 1–32)
BILIRUB SERPL-MCNC: 0.2 MG/DL (ref 0–1.2)
BUN SERPL-MCNC: 19 MG/DL (ref 8–23)
BUN/CREAT SERPL: 17.6 (ref 7–25)
CALCIUM SPEC-SCNC: 9.4 MG/DL (ref 8.6–10.5)
CHLORIDE SERPL-SCNC: 106 MMOL/L (ref 98–107)
CHOLEST SERPL-MCNC: 152 MG/DL (ref 0–200)
CO2 SERPL-SCNC: 29 MMOL/L (ref 22–29)
CREAT SERPL-MCNC: 1.08 MG/DL (ref 0.57–1)
GFR SERPL CREATININE-BSD FRML MDRD: 50 ML/MIN/1.73
GLOBULIN UR ELPH-MCNC: 2.2 GM/DL
GLUCOSE SERPL-MCNC: 87 MG/DL (ref 65–99)
HDLC SERPL-MCNC: 79 MG/DL (ref 40–60)
LDLC SERPL CALC-MCNC: 65 MG/DL (ref 0–100)
LDLC/HDLC SERPL: 0.84 {RATIO}
POTASSIUM SERPL-SCNC: 4.7 MMOL/L (ref 3.5–5.2)
PROT SERPL-MCNC: 6.4 G/DL (ref 6–8.5)
SODIUM SERPL-SCNC: 143 MMOL/L (ref 136–145)
TRIGL SERPL-MCNC: 32 MG/DL (ref 0–150)
VLDLC SERPL-MCNC: 8 MG/DL (ref 5–40)

## 2021-09-15 PROCEDURE — 80061 LIPID PANEL: CPT

## 2021-09-15 PROCEDURE — 80053 COMPREHEN METABOLIC PANEL: CPT

## 2021-09-15 PROCEDURE — 36415 COLL VENOUS BLD VENIPUNCTURE: CPT

## 2021-12-03 RX ORDER — LEVOTHYROXINE SODIUM 0.03 MG/1
TABLET ORAL
Qty: 90 TABLET | Refills: 0 | Status: SHIPPED | OUTPATIENT
Start: 2021-12-03

## 2022-03-11 ENCOUNTER — LAB (OUTPATIENT)
Dept: LAB | Facility: HOSPITAL | Age: 67
End: 2022-03-11

## 2022-03-11 ENCOUNTER — TRANSCRIBE ORDERS (OUTPATIENT)
Dept: ADMINISTRATIVE | Facility: HOSPITAL | Age: 67
End: 2022-03-11

## 2022-03-11 DIAGNOSIS — E78.5 HYPERLIPIDEMIA, UNSPECIFIED HYPERLIPIDEMIA TYPE: ICD-10-CM

## 2022-03-11 DIAGNOSIS — Z00.00 ROUTINE GENERAL MEDICAL EXAMINATION AT A HEALTH CARE FACILITY: Primary | ICD-10-CM

## 2022-03-11 DIAGNOSIS — I10 ESSENTIAL HYPERTENSION, MALIGNANT: ICD-10-CM

## 2022-03-11 DIAGNOSIS — Z00.00 ROUTINE GENERAL MEDICAL EXAMINATION AT A HEALTH CARE FACILITY: ICD-10-CM

## 2022-03-11 LAB
ALBUMIN SERPL-MCNC: 4.5 G/DL (ref 3.5–5.2)
ALBUMIN/GLOB SERPL: 1.9 G/DL
ALP SERPL-CCNC: 79 U/L (ref 39–117)
ALT SERPL W P-5'-P-CCNC: 13 U/L (ref 1–33)
ANION GAP SERPL CALCULATED.3IONS-SCNC: 7 MMOL/L (ref 5–15)
AST SERPL-CCNC: 19 U/L (ref 1–32)
BACTERIA UR QL AUTO: ABNORMAL /HPF
BASOPHILS # BLD AUTO: 0.06 10*3/MM3 (ref 0–0.2)
BASOPHILS NFR BLD AUTO: 1 % (ref 0–1.5)
BILIRUB SERPL-MCNC: 0.5 MG/DL (ref 0–1.2)
BILIRUB UR QL STRIP: NEGATIVE
BUN SERPL-MCNC: 15 MG/DL (ref 8–23)
BUN/CREAT SERPL: 18.3 (ref 7–25)
CALCIUM SPEC-SCNC: 9.4 MG/DL (ref 8.6–10.5)
CHLORIDE SERPL-SCNC: 103 MMOL/L (ref 98–107)
CHOLEST SERPL-MCNC: 148 MG/DL (ref 0–200)
CLARITY UR: CLEAR
CO2 SERPL-SCNC: 31 MMOL/L (ref 22–29)
COLOR UR: YELLOW
CREAT SERPL-MCNC: 0.82 MG/DL (ref 0.57–1)
DEPRECATED RDW RBC AUTO: 37.9 FL (ref 37–54)
EGFRCR SERPLBLD CKD-EPI 2021: 78 ML/MIN/1.73
EOSINOPHIL # BLD AUTO: 0.11 10*3/MM3 (ref 0–0.4)
EOSINOPHIL NFR BLD AUTO: 1.8 % (ref 0.3–6.2)
ERYTHROCYTE [DISTWIDTH] IN BLOOD BY AUTOMATED COUNT: 11.4 % (ref 12.3–15.4)
GLOBULIN UR ELPH-MCNC: 2.4 GM/DL
GLUCOSE SERPL-MCNC: 87 MG/DL (ref 65–99)
GLUCOSE UR STRIP-MCNC: NEGATIVE MG/DL
HCT VFR BLD AUTO: 36.8 % (ref 34–46.6)
HDLC SERPL-MCNC: 86 MG/DL (ref 40–60)
HGB BLD-MCNC: 12.3 G/DL (ref 12–15.9)
HGB UR QL STRIP.AUTO: ABNORMAL
HYALINE CASTS UR QL AUTO: ABNORMAL /LPF
IMM GRANULOCYTES # BLD AUTO: 0.01 10*3/MM3 (ref 0–0.05)
IMM GRANULOCYTES NFR BLD AUTO: 0.2 % (ref 0–0.5)
KETONES UR QL STRIP: NEGATIVE
LDLC SERPL CALC-MCNC: 53 MG/DL (ref 0–100)
LDLC/HDLC SERPL: 0.64 {RATIO}
LEUKOCYTE ESTERASE UR QL STRIP.AUTO: NEGATIVE
LYMPHOCYTES # BLD AUTO: 1.92 10*3/MM3 (ref 0.7–3.1)
LYMPHOCYTES NFR BLD AUTO: 32 % (ref 19.6–45.3)
MCH RBC QN AUTO: 30.7 PG (ref 26.6–33)
MCHC RBC AUTO-ENTMCNC: 33.4 G/DL (ref 31.5–35.7)
MCV RBC AUTO: 91.8 FL (ref 79–97)
MONOCYTES # BLD AUTO: 0.45 10*3/MM3 (ref 0.1–0.9)
MONOCYTES NFR BLD AUTO: 7.5 % (ref 5–12)
NEUTROPHILS NFR BLD AUTO: 3.45 10*3/MM3 (ref 1.7–7)
NEUTROPHILS NFR BLD AUTO: 57.5 % (ref 42.7–76)
NITRITE UR QL STRIP: NEGATIVE
NRBC BLD AUTO-RTO: 0 /100 WBC (ref 0–0.2)
PH UR STRIP.AUTO: 7.5 [PH] (ref 5–8)
PLATELET # BLD AUTO: 304 10*3/MM3 (ref 140–450)
PMV BLD AUTO: 9.5 FL (ref 6–12)
POTASSIUM SERPL-SCNC: 3.9 MMOL/L (ref 3.5–5.2)
PROT SERPL-MCNC: 6.9 G/DL (ref 6–8.5)
PROT UR QL STRIP: NEGATIVE
RBC # BLD AUTO: 4.01 10*6/MM3 (ref 3.77–5.28)
RBC # UR STRIP: ABNORMAL /HPF
REF LAB TEST METHOD: ABNORMAL
SODIUM SERPL-SCNC: 141 MMOL/L (ref 136–145)
SP GR UR STRIP: 1.02 (ref 1–1.03)
SQUAMOUS #/AREA URNS HPF: ABNORMAL /HPF
TRIGL SERPL-MCNC: 34 MG/DL (ref 0–150)
UROBILINOGEN UR QL STRIP: ABNORMAL
VLDLC SERPL-MCNC: 9 MG/DL (ref 5–40)
WBC # UR STRIP: ABNORMAL /HPF
WBC NRBC COR # BLD: 6 10*3/MM3 (ref 3.4–10.8)

## 2022-03-11 PROCEDURE — 85025 COMPLETE CBC W/AUTO DIFF WBC: CPT

## 2022-03-11 PROCEDURE — 80061 LIPID PANEL: CPT

## 2022-03-11 PROCEDURE — 80053 COMPREHEN METABOLIC PANEL: CPT

## 2022-03-11 PROCEDURE — 36415 COLL VENOUS BLD VENIPUNCTURE: CPT

## 2022-03-11 PROCEDURE — 81001 URINALYSIS AUTO W/SCOPE: CPT

## 2022-06-16 ENCOUNTER — APPOINTMENT (OUTPATIENT)
Dept: WOMENS IMAGING | Facility: HOSPITAL | Age: 67
End: 2022-06-16

## 2022-06-16 PROCEDURE — 77067 SCR MAMMO BI INCL CAD: CPT | Performed by: RADIOLOGY

## 2022-06-16 PROCEDURE — 77063 BREAST TOMOSYNTHESIS BI: CPT | Performed by: RADIOLOGY

## 2022-09-12 ENCOUNTER — LAB (OUTPATIENT)
Dept: LAB | Facility: HOSPITAL | Age: 67
End: 2022-09-12

## 2022-09-12 ENCOUNTER — TRANSCRIBE ORDERS (OUTPATIENT)
Dept: ADMINISTRATIVE | Facility: HOSPITAL | Age: 67
End: 2022-09-12

## 2022-09-12 DIAGNOSIS — I10 ESSENTIAL HYPERTENSION, MALIGNANT: ICD-10-CM

## 2022-09-12 DIAGNOSIS — E78.5 HYPERLIPIDEMIA, UNSPECIFIED HYPERLIPIDEMIA TYPE: Primary | ICD-10-CM

## 2022-09-12 DIAGNOSIS — E03.9 ACQUIRED HYPOTHYROIDISM: ICD-10-CM

## 2022-09-12 DIAGNOSIS — E78.5 HYPERLIPIDEMIA, UNSPECIFIED HYPERLIPIDEMIA TYPE: ICD-10-CM

## 2022-09-12 LAB
ALBUMIN SERPL-MCNC: 4.3 G/DL (ref 3.5–5.2)
ALBUMIN/GLOB SERPL: 2.3 G/DL
ALP SERPL-CCNC: 72 U/L (ref 39–117)
ALT SERPL W P-5'-P-CCNC: 12 U/L (ref 1–33)
ANION GAP SERPL CALCULATED.3IONS-SCNC: 9 MMOL/L (ref 5–15)
AST SERPL-CCNC: 16 U/L (ref 1–32)
BILIRUB SERPL-MCNC: 0.4 MG/DL (ref 0–1.2)
BUN SERPL-MCNC: 20 MG/DL (ref 8–23)
BUN/CREAT SERPL: 18 (ref 7–25)
CALCIUM SPEC-SCNC: 9.7 MG/DL (ref 8.6–10.5)
CHLORIDE SERPL-SCNC: 105 MMOL/L (ref 98–107)
CHOLEST SERPL-MCNC: 143 MG/DL (ref 0–200)
CO2 SERPL-SCNC: 27 MMOL/L (ref 22–29)
CREAT SERPL-MCNC: 1.11 MG/DL (ref 0.57–1)
EGFRCR SERPLBLD CKD-EPI 2021: 53.9 ML/MIN/1.73
GLOBULIN UR ELPH-MCNC: 1.9 GM/DL
GLUCOSE SERPL-MCNC: 80 MG/DL (ref 65–99)
HDLC SERPL-MCNC: 80 MG/DL (ref 40–60)
LDLC SERPL CALC-MCNC: 52 MG/DL (ref 0–100)
LDLC/HDLC SERPL: 0.67 {RATIO}
POTASSIUM SERPL-SCNC: 4.6 MMOL/L (ref 3.5–5.2)
PROT SERPL-MCNC: 6.2 G/DL (ref 6–8.5)
SODIUM SERPL-SCNC: 141 MMOL/L (ref 136–145)
TRIGL SERPL-MCNC: 46 MG/DL (ref 0–150)
TSH SERPL DL<=0.05 MIU/L-ACNC: 1.06 UIU/ML (ref 0.27–4.2)
VLDLC SERPL-MCNC: 11 MG/DL (ref 5–40)

## 2022-09-12 PROCEDURE — 84443 ASSAY THYROID STIM HORMONE: CPT

## 2022-09-12 PROCEDURE — 80061 LIPID PANEL: CPT

## 2022-09-12 PROCEDURE — 80053 COMPREHEN METABOLIC PANEL: CPT

## 2022-09-12 PROCEDURE — 36415 COLL VENOUS BLD VENIPUNCTURE: CPT

## 2023-04-03 ENCOUNTER — LAB (OUTPATIENT)
Dept: LAB | Facility: HOSPITAL | Age: 68
End: 2023-04-03
Payer: MEDICARE

## 2023-04-03 ENCOUNTER — TRANSCRIBE ORDERS (OUTPATIENT)
Dept: ADMINISTRATIVE | Facility: HOSPITAL | Age: 68
End: 2023-04-03
Payer: MEDICARE

## 2023-04-03 DIAGNOSIS — E03.9 HYPOTHYROIDISM, UNSPECIFIED TYPE: ICD-10-CM

## 2023-04-03 DIAGNOSIS — Z00.00 ROUTINE GENERAL MEDICAL EXAMINATION AT A HEALTH CARE FACILITY: Primary | ICD-10-CM

## 2023-04-03 DIAGNOSIS — I10 ESSENTIAL HYPERTENSION, MALIGNANT: ICD-10-CM

## 2023-04-03 DIAGNOSIS — Z00.00 ROUTINE GENERAL MEDICAL EXAMINATION AT A HEALTH CARE FACILITY: ICD-10-CM

## 2023-04-03 DIAGNOSIS — E78.5 HYPERLIPIDEMIA, UNSPECIFIED HYPERLIPIDEMIA TYPE: ICD-10-CM

## 2023-04-03 LAB
ALBUMIN SERPL-MCNC: 4.3 G/DL (ref 3.5–5.2)
ALBUMIN/GLOB SERPL: 1.9 G/DL
ALP SERPL-CCNC: 67 U/L (ref 39–117)
ALT SERPL W P-5'-P-CCNC: 20 U/L (ref 1–33)
ANION GAP SERPL CALCULATED.3IONS-SCNC: 8.3 MMOL/L (ref 5–15)
AST SERPL-CCNC: 21 U/L (ref 1–32)
BACTERIA UR QL AUTO: ABNORMAL /HPF
BASOPHILS # BLD AUTO: 0.04 10*3/MM3 (ref 0–0.2)
BASOPHILS NFR BLD AUTO: 0.6 % (ref 0–1.5)
BILIRUB SERPL-MCNC: 0.4 MG/DL (ref 0–1.2)
BILIRUB UR QL STRIP: NEGATIVE
BUN SERPL-MCNC: 15 MG/DL (ref 8–23)
BUN/CREAT SERPL: 18.1 (ref 7–25)
CALCIUM SPEC-SCNC: 9.7 MG/DL (ref 8.6–10.5)
CHLORIDE SERPL-SCNC: 107 MMOL/L (ref 98–107)
CHOLEST SERPL-MCNC: 126 MG/DL (ref 0–200)
CLARITY UR: CLEAR
CO2 SERPL-SCNC: 27.7 MMOL/L (ref 22–29)
COLOR UR: YELLOW
CREAT SERPL-MCNC: 0.83 MG/DL (ref 0.57–1)
DEPRECATED RDW RBC AUTO: 39.3 FL (ref 37–54)
EGFRCR SERPLBLD CKD-EPI 2021: 76.4 ML/MIN/1.73
EOSINOPHIL # BLD AUTO: 0.19 10*3/MM3 (ref 0–0.4)
EOSINOPHIL NFR BLD AUTO: 2.7 % (ref 0.3–6.2)
ERYTHROCYTE [DISTWIDTH] IN BLOOD BY AUTOMATED COUNT: 11.8 % (ref 12.3–15.4)
GLOBULIN UR ELPH-MCNC: 2.3 GM/DL
GLUCOSE SERPL-MCNC: 86 MG/DL (ref 65–99)
GLUCOSE UR STRIP-MCNC: NEGATIVE MG/DL
HCT VFR BLD AUTO: 35.8 % (ref 34–46.6)
HDLC SERPL-MCNC: 59 MG/DL (ref 40–60)
HGB BLD-MCNC: 12.1 G/DL (ref 12–15.9)
HGB UR QL STRIP.AUTO: ABNORMAL
HYALINE CASTS UR QL AUTO: ABNORMAL /LPF
IMM GRANULOCYTES # BLD AUTO: 0.03 10*3/MM3 (ref 0–0.05)
IMM GRANULOCYTES NFR BLD AUTO: 0.4 % (ref 0–0.5)
KETONES UR QL STRIP: NEGATIVE
LDLC SERPL CALC-MCNC: 56 MG/DL (ref 0–100)
LDLC/HDLC SERPL: 0.99 {RATIO}
LEUKOCYTE ESTERASE UR QL STRIP.AUTO: NEGATIVE
LYMPHOCYTES # BLD AUTO: 2.56 10*3/MM3 (ref 0.7–3.1)
LYMPHOCYTES NFR BLD AUTO: 35.9 % (ref 19.6–45.3)
MCH RBC QN AUTO: 30.9 PG (ref 26.6–33)
MCHC RBC AUTO-ENTMCNC: 33.8 G/DL (ref 31.5–35.7)
MCV RBC AUTO: 91.3 FL (ref 79–97)
MONOCYTES # BLD AUTO: 0.59 10*3/MM3 (ref 0.1–0.9)
MONOCYTES NFR BLD AUTO: 8.3 % (ref 5–12)
NEUTROPHILS NFR BLD AUTO: 3.73 10*3/MM3 (ref 1.7–7)
NEUTROPHILS NFR BLD AUTO: 52.1 % (ref 42.7–76)
NITRITE UR QL STRIP: NEGATIVE
NRBC BLD AUTO-RTO: 0 /100 WBC (ref 0–0.2)
PH UR STRIP.AUTO: 6 [PH] (ref 5–8)
PLATELET # BLD AUTO: 319 10*3/MM3 (ref 140–450)
PMV BLD AUTO: 9.2 FL (ref 6–12)
POTASSIUM SERPL-SCNC: 4.7 MMOL/L (ref 3.5–5.2)
PROT SERPL-MCNC: 6.6 G/DL (ref 6–8.5)
PROT UR QL STRIP: NEGATIVE
RBC # BLD AUTO: 3.92 10*6/MM3 (ref 3.77–5.28)
RBC # UR STRIP: ABNORMAL /HPF
REF LAB TEST METHOD: ABNORMAL
SODIUM SERPL-SCNC: 143 MMOL/L (ref 136–145)
SP GR UR STRIP: 1.02 (ref 1–1.03)
SQUAMOUS #/AREA URNS HPF: ABNORMAL /HPF
TRIGL SERPL-MCNC: 44 MG/DL (ref 0–150)
TSH SERPL DL<=0.05 MIU/L-ACNC: 1.39 UIU/ML (ref 0.27–4.2)
UROBILINOGEN UR QL STRIP: ABNORMAL
VLDLC SERPL-MCNC: 11 MG/DL (ref 5–40)
WBC # UR STRIP: ABNORMAL /HPF
WBC NRBC COR # BLD: 7.14 10*3/MM3 (ref 3.4–10.8)

## 2023-04-03 PROCEDURE — 81001 URINALYSIS AUTO W/SCOPE: CPT

## 2023-04-03 PROCEDURE — 85025 COMPLETE CBC W/AUTO DIFF WBC: CPT

## 2023-04-03 PROCEDURE — 36415 COLL VENOUS BLD VENIPUNCTURE: CPT

## 2023-04-03 PROCEDURE — 80061 LIPID PANEL: CPT

## 2023-04-03 PROCEDURE — 80053 COMPREHEN METABOLIC PANEL: CPT

## 2023-04-03 PROCEDURE — 84443 ASSAY THYROID STIM HORMONE: CPT

## 2023-05-21 ENCOUNTER — HOSPITAL ENCOUNTER (EMERGENCY)
Facility: HOSPITAL | Age: 68
Discharge: HOME | End: 2023-05-21
Attending: EMERGENCY MEDICINE | Admitting: EMERGENCY MEDICINE
Payer: MEDICARE

## 2023-05-21 ENCOUNTER — APPOINTMENT (OUTPATIENT)
Dept: GENERAL RADIOLOGY | Facility: HOSPITAL | Age: 68
End: 2023-05-21
Payer: MEDICARE

## 2023-05-21 ENCOUNTER — APPOINTMENT (OUTPATIENT)
Dept: CT IMAGING | Facility: HOSPITAL | Age: 68
End: 2023-05-21
Payer: MEDICARE

## 2023-05-21 VITALS
TEMPERATURE: 98.7 F | RESPIRATION RATE: 16 BRPM | OXYGEN SATURATION: 97 % | WEIGHT: 140 LBS | SYSTOLIC BLOOD PRESSURE: 154 MMHG | HEIGHT: 60 IN | HEART RATE: 79 BPM | BODY MASS INDEX: 27.48 KG/M2 | DIASTOLIC BLOOD PRESSURE: 94 MMHG

## 2023-05-21 DIAGNOSIS — S69.91XA SCAPHOLUNATE LIGAMENT INJURY WITH NO INSTABILITY, RIGHT, INITIAL ENCOUNTER: Primary | ICD-10-CM

## 2023-05-21 PROCEDURE — 73080 X-RAY EXAM OF ELBOW: CPT

## 2023-05-21 PROCEDURE — 99283 EMERGENCY DEPT VISIT LOW MDM: CPT

## 2023-05-21 PROCEDURE — 73200 CT UPPER EXTREMITY W/O DYE: CPT

## 2023-05-21 PROCEDURE — 73110 X-RAY EXAM OF WRIST: CPT

## 2023-05-21 RX ORDER — ACETAMINOPHEN 500 MG
1000 TABLET ORAL ONCE
Status: COMPLETED | OUTPATIENT
Start: 2023-05-21 | End: 2023-05-21

## 2023-05-21 RX ADMIN — ACETAMINOPHEN 1000 MG: 500 TABLET ORAL at 19:48

## 2023-05-21 NOTE — FSED PROVIDER NOTE
Subjective   History of Present Illness  Patient states she was walking with her  stubbed her toe and fell forward on the sidewalk injuring her right wrist and elbow.  She states most of the force was taken by her right wrist and elbow but did slightly bump her right eye.  She denies any headache, loss of consciousness, neck pain, nausea vomiting weakness or blurry vision.  She is mainly concerned about her right wrist and hand and has not had anything prior to arrival.  She denies any lower extremity injury or pain and was able to get up and walk around afterwards.  She also denies any preceding chest pain, abdominal pain, fevers chills weakness or diarrhea.        Review of Systems   Constitutional: Negative for chills, diaphoresis and fever.   HENT: Negative for congestion, sore throat and trouble swallowing.    Eyes: Negative for visual disturbance.   Respiratory: Negative for cough and shortness of breath.    Cardiovascular: Negative for chest pain and palpitations.   Gastrointestinal: Negative for abdominal pain, constipation, diarrhea, nausea and vomiting.   Genitourinary: Negative for dysuria, frequency, hematuria and urgency.   Skin: Positive for wound. Negative for rash.   Neurological: Negative for weakness, light-headedness, numbness and headaches.   Psychiatric/Behavioral: Negative for confusion.       Past Medical History:   Diagnosis Date   • Hypertension    • Insomnia    • Migraines        No Known Allergies    History reviewed. No pertinent surgical history.    Family History   Problem Relation Age of Onset   • Macular degeneration Mother    • Heart failure Father        Social History     Socioeconomic History   • Marital status:    Tobacco Use   • Smoking status: Former   • Smokeless tobacco: Never           Objective   Physical Exam  Constitutional:       General: She is not in acute distress.     Appearance: She is well-developed. She is not diaphoretic.   HENT:      Head:  Normocephalic.      Comments: Very tiny bruise to right eyebrow     Nose: Nose normal.   Eyes:      Conjunctiva/sclera: Conjunctivae normal.      Pupils: Pupils are equal, round, and reactive to light.   Neck:      Thyroid: No thyromegaly.      Vascular: No JVD.      Trachea: No tracheal deviation.   Cardiovascular:      Rate and Rhythm: Normal rate and regular rhythm.      Heart sounds: Normal heart sounds. No murmur heard.    No friction rub. No gallop.   Pulmonary:      Effort: Pulmonary effort is normal. No respiratory distress.      Breath sounds: Normal breath sounds. No stridor. No wheezing or rales.   Abdominal:      General: Bowel sounds are normal. There is no distension.      Palpations: Abdomen is soft. There is no mass.      Tenderness: There is no abdominal tenderness. There is no guarding or rebound.      Hernia: No hernia is present.   Musculoskeletal:         General: Tenderness and signs of injury present. No deformity. Normal range of motion.      Cervical back: Normal range of motion and neck supple.      Comments: Right elbow has a superficial abrasion over the olecranon without any underlying tenderness or deformity.  Right palm and wrist has some mild tenderness just distal to the distal radius with a small dorsal superficial abrasion.  No underlying snuffbox tenderness appreciated or deformity   Skin:     General: Skin is warm and dry.      Capillary Refill: Capillary refill takes less than 2 seconds.      Coloration: Skin is not pale.      Findings: No erythema or rash.   Neurological:      Mental Status: She is alert and oriented to person, place, and time.      Cranial Nerves: No cranial nerve deficit.      Sensory: No sensory deficit.      Motor: No abnormal muscle tone.      Coordination: Coordination normal.   Psychiatric:         Behavior: Behavior normal.         Thought Content: Thought content normal.         Judgment: Judgment normal.         Procedures           ED Course                                            Medical Decision Making  Patient advised to follow-up with the hand specialist, put in wrist splint in the emergency room.    Scapholunate ligament injury with no instability, right, initial encounter: acute illness or injury  Amount and/or Complexity of Data Reviewed  Radiology: ordered.     Details: Radiology reviewed      Risk  OTC drugs.          Final diagnoses:   Scapholunate ligament injury with no instability, right, initial encounter       ED Disposition  ED Disposition     ED Disposition   Discharge    Condition   Stable    Comment   --             KLEINERT KUTZ [facility] - [address]  [facility] [address] [phone]  In 1 week           Medication List      No changes were made to your prescriptions during this visit.

## 2023-05-22 NOTE — DISCHARGE INSTRUCTIONS
Follow the instructions given and use Motrin and Tylenol as directed for pain.  Follow-up with a hand specialist in 1 to 2 weeks.  Limit activity or heavy lifting with that hand and do not do any repetitive motions that are causing pain in the hand.  Return immediately to the ER with any increasing pain or any other concerns.

## 2023-10-03 ENCOUNTER — LAB (OUTPATIENT)
Dept: LAB | Facility: HOSPITAL | Age: 68
End: 2023-10-03
Payer: MEDICARE

## 2023-10-03 ENCOUNTER — TRANSCRIBE ORDERS (OUTPATIENT)
Dept: ADMINISTRATIVE | Facility: HOSPITAL | Age: 68
End: 2023-10-03
Payer: MEDICARE

## 2023-10-03 DIAGNOSIS — I10 ESSENTIAL HYPERTENSION, BENIGN: ICD-10-CM

## 2023-10-03 DIAGNOSIS — E78.5 HYPERLIPIDEMIA, UNSPECIFIED HYPERLIPIDEMIA TYPE: Primary | ICD-10-CM

## 2023-10-03 DIAGNOSIS — E78.5 HYPERLIPIDEMIA, UNSPECIFIED HYPERLIPIDEMIA TYPE: ICD-10-CM

## 2023-10-03 LAB
ALBUMIN SERPL-MCNC: 4.3 G/DL (ref 3.5–5.2)
ALBUMIN/GLOB SERPL: 1.8 G/DL
ALP SERPL-CCNC: 65 U/L (ref 39–117)
ALT SERPL W P-5'-P-CCNC: 12 U/L (ref 1–33)
ANION GAP SERPL CALCULATED.3IONS-SCNC: 9.1 MMOL/L (ref 5–15)
AST SERPL-CCNC: 20 U/L (ref 1–32)
BILIRUB SERPL-MCNC: 0.4 MG/DL (ref 0–1.2)
BUN SERPL-MCNC: 16 MG/DL (ref 8–23)
BUN/CREAT SERPL: 17 (ref 7–25)
CALCIUM SPEC-SCNC: 9.3 MG/DL (ref 8.6–10.5)
CHLORIDE SERPL-SCNC: 104 MMOL/L (ref 98–107)
CHOLEST SERPL-MCNC: 162 MG/DL (ref 0–200)
CO2 SERPL-SCNC: 26.9 MMOL/L (ref 22–29)
CREAT SERPL-MCNC: 0.94 MG/DL (ref 0.57–1)
EGFRCR SERPLBLD CKD-EPI 2021: 65.4 ML/MIN/1.73
GLOBULIN UR ELPH-MCNC: 2.4 GM/DL
GLUCOSE SERPL-MCNC: 85 MG/DL (ref 65–99)
HDLC SERPL-MCNC: 88 MG/DL (ref 40–60)
LDLC SERPL CALC-MCNC: 65 MG/DL (ref 0–100)
LDLC/HDLC SERPL: 0.75 {RATIO}
POTASSIUM SERPL-SCNC: 4.3 MMOL/L (ref 3.5–5.2)
PROT SERPL-MCNC: 6.7 G/DL (ref 6–8.5)
SODIUM SERPL-SCNC: 140 MMOL/L (ref 136–145)
TRIGL SERPL-MCNC: 41 MG/DL (ref 0–150)
VLDLC SERPL-MCNC: 9 MG/DL (ref 5–40)

## 2023-10-03 PROCEDURE — 80061 LIPID PANEL: CPT

## 2023-10-03 PROCEDURE — 36415 COLL VENOUS BLD VENIPUNCTURE: CPT

## 2023-10-03 PROCEDURE — 80053 COMPREHEN METABOLIC PANEL: CPT

## 2024-04-08 ENCOUNTER — LAB (OUTPATIENT)
Dept: LAB | Facility: HOSPITAL | Age: 69
End: 2024-04-08
Payer: MEDICARE

## 2024-04-08 ENCOUNTER — TRANSCRIBE ORDERS (OUTPATIENT)
Dept: ADMINISTRATIVE | Facility: HOSPITAL | Age: 69
End: 2024-04-08
Payer: MEDICARE

## 2024-04-08 DIAGNOSIS — E78.5 HYPERLIPIDEMIA, UNSPECIFIED HYPERLIPIDEMIA TYPE: ICD-10-CM

## 2024-04-08 DIAGNOSIS — Z00.00 ROUTINE GENERAL MEDICAL EXAMINATION AT A HEALTH CARE FACILITY: Primary | ICD-10-CM

## 2024-04-08 DIAGNOSIS — Z00.00 ROUTINE GENERAL MEDICAL EXAMINATION AT A HEALTH CARE FACILITY: ICD-10-CM

## 2024-04-08 DIAGNOSIS — I10 ESSENTIAL HYPERTENSION, BENIGN: ICD-10-CM

## 2024-04-08 DIAGNOSIS — E03.9 ACQUIRED HYPOTHYROIDISM: ICD-10-CM

## 2024-04-08 LAB
ALBUMIN SERPL-MCNC: 4.4 G/DL (ref 3.5–5.2)
ALBUMIN/GLOB SERPL: 2 G/DL
ALP SERPL-CCNC: 80 U/L (ref 39–117)
ALT SERPL W P-5'-P-CCNC: 15 U/L (ref 1–33)
ANION GAP SERPL CALCULATED.3IONS-SCNC: 10 MMOL/L (ref 5–15)
AST SERPL-CCNC: 20 U/L (ref 1–32)
BACTERIA UR QL AUTO: ABNORMAL /HPF
BASOPHILS # BLD AUTO: 0.04 10*3/MM3 (ref 0–0.2)
BASOPHILS NFR BLD AUTO: 0.7 % (ref 0–1.5)
BILIRUB SERPL-MCNC: 0.4 MG/DL (ref 0–1.2)
BILIRUB UR QL STRIP: NEGATIVE
BUN SERPL-MCNC: 15 MG/DL (ref 8–23)
BUN/CREAT SERPL: 17 (ref 7–25)
CALCIUM SPEC-SCNC: 9.5 MG/DL (ref 8.6–10.5)
CHLORIDE SERPL-SCNC: 104 MMOL/L (ref 98–107)
CHOLEST SERPL-MCNC: 170 MG/DL (ref 0–200)
CLARITY UR: CLEAR
CO2 SERPL-SCNC: 28 MMOL/L (ref 22–29)
COLOR UR: ABNORMAL
CREAT SERPL-MCNC: 0.88 MG/DL (ref 0.57–1)
DEPRECATED RDW RBC AUTO: 39.6 FL (ref 37–54)
EGFRCR SERPLBLD CKD-EPI 2021: 70.8 ML/MIN/1.73
EOSINOPHIL # BLD AUTO: 0.15 10*3/MM3 (ref 0–0.4)
EOSINOPHIL NFR BLD AUTO: 2.5 % (ref 0.3–6.2)
ERYTHROCYTE [DISTWIDTH] IN BLOOD BY AUTOMATED COUNT: 11.5 % (ref 12.3–15.4)
GLOBULIN UR ELPH-MCNC: 2.2 GM/DL
GLUCOSE SERPL-MCNC: 98 MG/DL (ref 65–99)
GLUCOSE UR STRIP-MCNC: NEGATIVE MG/DL
HCT VFR BLD AUTO: 38.3 % (ref 34–46.6)
HDLC SERPL-MCNC: 83 MG/DL (ref 40–60)
HGB BLD-MCNC: 12.6 G/DL (ref 12–15.9)
HGB UR QL STRIP.AUTO: ABNORMAL
HYALINE CASTS UR QL AUTO: ABNORMAL /LPF
IMM GRANULOCYTES # BLD AUTO: 0.01 10*3/MM3 (ref 0–0.05)
IMM GRANULOCYTES NFR BLD AUTO: 0.2 % (ref 0–0.5)
KETONES UR QL STRIP: ABNORMAL
LDLC SERPL CALC-MCNC: 77 MG/DL (ref 0–100)
LDLC/HDLC SERPL: 0.93 {RATIO}
LEUKOCYTE ESTERASE UR QL STRIP.AUTO: NEGATIVE
LYMPHOCYTES # BLD AUTO: 2.46 10*3/MM3 (ref 0.7–3.1)
LYMPHOCYTES NFR BLD AUTO: 40.2 % (ref 19.6–45.3)
MCH RBC QN AUTO: 30.5 PG (ref 26.6–33)
MCHC RBC AUTO-ENTMCNC: 32.9 G/DL (ref 31.5–35.7)
MCV RBC AUTO: 92.7 FL (ref 79–97)
MONOCYTES # BLD AUTO: 0.52 10*3/MM3 (ref 0.1–0.9)
MONOCYTES NFR BLD AUTO: 8.5 % (ref 5–12)
NEUTROPHILS NFR BLD AUTO: 2.94 10*3/MM3 (ref 1.7–7)
NEUTROPHILS NFR BLD AUTO: 47.9 % (ref 42.7–76)
NITRITE UR QL STRIP: NEGATIVE
NRBC BLD AUTO-RTO: 0 /100 WBC (ref 0–0.2)
PH UR STRIP.AUTO: 6 [PH] (ref 5–8)
PLATELET # BLD AUTO: 276 10*3/MM3 (ref 140–450)
PMV BLD AUTO: 9.4 FL (ref 6–12)
POTASSIUM SERPL-SCNC: 4.2 MMOL/L (ref 3.5–5.2)
PROT SERPL-MCNC: 6.6 G/DL (ref 6–8.5)
PROT UR QL STRIP: ABNORMAL
RBC # BLD AUTO: 4.13 10*6/MM3 (ref 3.77–5.28)
RBC # UR STRIP: ABNORMAL /HPF
REF LAB TEST METHOD: ABNORMAL
SODIUM SERPL-SCNC: 142 MMOL/L (ref 136–145)
SP GR UR STRIP: 1.02 (ref 1–1.03)
SQUAMOUS #/AREA URNS HPF: ABNORMAL /HPF
TRIGL SERPL-MCNC: 48 MG/DL (ref 0–150)
TSH SERPL DL<=0.05 MIU/L-ACNC: 1.73 UIU/ML (ref 0.27–4.2)
UROBILINOGEN UR QL STRIP: ABNORMAL
VLDLC SERPL-MCNC: 10 MG/DL (ref 5–40)
WBC # UR STRIP: ABNORMAL /HPF
WBC NRBC COR # BLD AUTO: 6.12 10*3/MM3 (ref 3.4–10.8)

## 2024-04-08 PROCEDURE — 80053 COMPREHEN METABOLIC PANEL: CPT

## 2024-04-08 PROCEDURE — 84443 ASSAY THYROID STIM HORMONE: CPT

## 2024-04-08 PROCEDURE — 80061 LIPID PANEL: CPT

## 2024-04-08 PROCEDURE — 36415 COLL VENOUS BLD VENIPUNCTURE: CPT

## 2024-04-08 PROCEDURE — 85025 COMPLETE CBC W/AUTO DIFF WBC: CPT

## 2024-04-08 PROCEDURE — 81001 URINALYSIS AUTO W/SCOPE: CPT

## 2024-05-22 ENCOUNTER — TRANSCRIBE ORDERS (OUTPATIENT)
Dept: ADMINISTRATIVE | Facility: HOSPITAL | Age: 69
End: 2024-05-22
Payer: MEDICARE

## 2024-05-22 ENCOUNTER — LAB (OUTPATIENT)
Dept: LAB | Facility: HOSPITAL | Age: 69
End: 2024-05-22
Payer: MEDICARE

## 2024-05-22 DIAGNOSIS — R31.29 MICROHEMATURIA: ICD-10-CM

## 2024-05-22 DIAGNOSIS — R31.29 MICROHEMATURIA: Primary | ICD-10-CM

## 2024-05-22 LAB
BACTERIA UR QL AUTO: ABNORMAL /HPF
BILIRUB UR QL STRIP: NEGATIVE
CLARITY UR: CLEAR
COLOR UR: YELLOW
GLUCOSE UR STRIP-MCNC: NEGATIVE MG/DL
HGB UR QL STRIP.AUTO: ABNORMAL
HYALINE CASTS UR QL AUTO: ABNORMAL /LPF
KETONES UR QL STRIP: ABNORMAL
LEUKOCYTE ESTERASE UR QL STRIP.AUTO: ABNORMAL
NITRITE UR QL STRIP: NEGATIVE
PH UR STRIP.AUTO: 6.5 [PH] (ref 5–8)
PROT UR QL STRIP: NEGATIVE
RBC # UR STRIP: ABNORMAL /HPF
REF LAB TEST METHOD: ABNORMAL
SP GR UR STRIP: 1.02 (ref 1–1.03)
SQUAMOUS #/AREA URNS HPF: ABNORMAL /HPF
UROBILINOGEN UR QL STRIP: ABNORMAL
WBC # UR STRIP: ABNORMAL /HPF

## 2024-05-22 PROCEDURE — 81001 URINALYSIS AUTO W/SCOPE: CPT

## 2024-07-01 ENCOUNTER — LAB (OUTPATIENT)
Dept: LAB | Facility: HOSPITAL | Age: 69
End: 2024-07-01
Payer: MEDICARE

## 2024-07-01 ENCOUNTER — TRANSCRIBE ORDERS (OUTPATIENT)
Dept: ADMINISTRATIVE | Facility: HOSPITAL | Age: 69
End: 2024-07-01
Payer: MEDICARE

## 2024-07-01 DIAGNOSIS — R31.29 MICROHEMATURIA: ICD-10-CM

## 2024-07-01 DIAGNOSIS — R31.29 MICROHEMATURIA: Primary | ICD-10-CM

## 2024-07-01 LAB
BACTERIA UR QL AUTO: NORMAL /HPF
BILIRUB UR QL STRIP: NEGATIVE
CLARITY UR: CLEAR
COLOR UR: YELLOW
GLUCOSE UR STRIP-MCNC: NEGATIVE MG/DL
HGB UR QL STRIP.AUTO: ABNORMAL
HYALINE CASTS UR QL AUTO: NORMAL /LPF
KETONES UR QL STRIP: NEGATIVE
LEUKOCYTE ESTERASE UR QL STRIP.AUTO: NEGATIVE
NITRITE UR QL STRIP: NEGATIVE
PH UR STRIP.AUTO: 6 [PH] (ref 5–8)
PROT UR QL STRIP: NEGATIVE
RBC # UR STRIP: NORMAL /HPF
REF LAB TEST METHOD: NORMAL
SP GR UR STRIP: 1.01 (ref 1–1.03)
SQUAMOUS #/AREA URNS HPF: NORMAL /HPF
UROBILINOGEN UR QL STRIP: ABNORMAL
WBC # UR STRIP: NORMAL /HPF

## 2024-07-01 PROCEDURE — 81001 URINALYSIS AUTO W/SCOPE: CPT

## 2024-10-09 ENCOUNTER — LAB (OUTPATIENT)
Dept: LAB | Facility: HOSPITAL | Age: 69
End: 2024-10-09
Payer: MEDICARE

## 2024-10-09 ENCOUNTER — TRANSCRIBE ORDERS (OUTPATIENT)
Dept: ADMINISTRATIVE | Facility: HOSPITAL | Age: 69
End: 2024-10-09
Payer: MEDICARE

## 2024-10-09 DIAGNOSIS — E03.9 HYPOTHYROIDISM, ADULT: ICD-10-CM

## 2024-10-09 DIAGNOSIS — I10 HYPERTENSION, UNSPECIFIED TYPE: ICD-10-CM

## 2024-10-09 DIAGNOSIS — E78.5 HYPERLIPIDEMIA, UNSPECIFIED HYPERLIPIDEMIA TYPE: ICD-10-CM

## 2024-10-09 DIAGNOSIS — E78.5 HYPERLIPIDEMIA, UNSPECIFIED HYPERLIPIDEMIA TYPE: Primary | ICD-10-CM

## 2024-10-09 LAB
ALBUMIN SERPL-MCNC: 4.1 G/DL (ref 3.5–5.2)
ALBUMIN/GLOB SERPL: 2 G/DL
ALP SERPL-CCNC: 76 U/L (ref 39–117)
ALT SERPL W P-5'-P-CCNC: 17 U/L (ref 1–33)
ANION GAP SERPL CALCULATED.3IONS-SCNC: 8.5 MMOL/L (ref 5–15)
AST SERPL-CCNC: 24 U/L (ref 1–32)
BILIRUB SERPL-MCNC: 0.4 MG/DL (ref 0–1.2)
BUN SERPL-MCNC: 17 MG/DL (ref 8–23)
BUN/CREAT SERPL: 20.7 (ref 7–25)
CALCIUM SPEC-SCNC: 9.4 MG/DL (ref 8.6–10.5)
CHLORIDE SERPL-SCNC: 102 MMOL/L (ref 98–107)
CHOLEST SERPL-MCNC: 146 MG/DL (ref 0–200)
CO2 SERPL-SCNC: 27.5 MMOL/L (ref 22–29)
CREAT SERPL-MCNC: 0.82 MG/DL (ref 0.57–1)
EGFRCR SERPLBLD CKD-EPI 2021: 76.6 ML/MIN/1.73
GLOBULIN UR ELPH-MCNC: 2.1 GM/DL
GLUCOSE SERPL-MCNC: 80 MG/DL (ref 65–99)
HDLC SERPL-MCNC: 74 MG/DL (ref 40–60)
LDLC SERPL CALC-MCNC: 61 MG/DL (ref 0–100)
LDLC/HDLC SERPL: 0.83 {RATIO}
POTASSIUM SERPL-SCNC: 4.5 MMOL/L (ref 3.5–5.2)
PROT SERPL-MCNC: 6.2 G/DL (ref 6–8.5)
SODIUM SERPL-SCNC: 138 MMOL/L (ref 136–145)
TRIGL SERPL-MCNC: 53 MG/DL (ref 0–150)
TSH SERPL DL<=0.05 MIU/L-ACNC: 1.11 UIU/ML (ref 0.27–4.2)
VLDLC SERPL-MCNC: 11 MG/DL (ref 5–40)

## 2024-10-09 PROCEDURE — 80053 COMPREHEN METABOLIC PANEL: CPT

## 2024-10-09 PROCEDURE — 36415 COLL VENOUS BLD VENIPUNCTURE: CPT

## 2024-10-09 PROCEDURE — 80061 LIPID PANEL: CPT

## 2024-10-09 PROCEDURE — 84443 ASSAY THYROID STIM HORMONE: CPT

## 2024-12-07 ENCOUNTER — HOSPITAL ENCOUNTER (INPATIENT)
Facility: HOSPITAL | Age: 69
LOS: 2 days | Discharge: HOME | DRG: 072 | End: 2024-12-09
Attending: EMERGENCY MEDICINE | Admitting: INTERNAL MEDICINE
Payer: MEDICARE

## 2024-12-07 ENCOUNTER — APPOINTMENT (OUTPATIENT)
Dept: GENERAL RADIOLOGY | Facility: HOSPITAL | Age: 69
DRG: 072 | End: 2024-12-07
Payer: MEDICARE

## 2024-12-07 ENCOUNTER — APPOINTMENT (OUTPATIENT)
Dept: CT IMAGING | Facility: HOSPITAL | Age: 69
DRG: 072 | End: 2024-12-07
Payer: MEDICARE

## 2024-12-07 DIAGNOSIS — G45.4 TGA (TRANSIENT GLOBAL AMNESIA): ICD-10-CM

## 2024-12-07 DIAGNOSIS — R41.3 MEMORY DEFICIT: Primary | ICD-10-CM

## 2024-12-07 LAB
ALBUMIN SERPL-MCNC: 4.4 G/DL (ref 3.5–5.2)
ALBUMIN/GLOB SERPL: 2.2 G/DL
ALP SERPL-CCNC: 78 U/L (ref 39–117)
ALT SERPL W P-5'-P-CCNC: 23 U/L (ref 1–33)
ANION GAP SERPL CALCULATED.3IONS-SCNC: 8.2 MMOL/L (ref 5–15)
AST SERPL-CCNC: 28 U/L (ref 1–32)
BACTERIA UR QL AUTO: NORMAL /HPF
BASOPHILS # BLD AUTO: 0.01 10*3/MM3 (ref 0–0.2)
BASOPHILS NFR BLD AUTO: 0.1 % (ref 0–1.5)
BILIRUB SERPL-MCNC: 0.3 MG/DL (ref 0–1.2)
BILIRUB UR QL STRIP: NEGATIVE
BUN SERPL-MCNC: 22 MG/DL (ref 8–23)
BUN/CREAT SERPL: 23.9 (ref 7–25)
CALCIUM SPEC-SCNC: 9.6 MG/DL (ref 8.6–10.5)
CHLORIDE SERPL-SCNC: 105 MMOL/L (ref 98–107)
CLARITY UR: CLEAR
CO2 SERPL-SCNC: 25.8 MMOL/L (ref 22–29)
COLOR UR: YELLOW
CREAT SERPL-MCNC: 0.92 MG/DL (ref 0.57–1)
DEPRECATED RDW RBC AUTO: 40.4 FL (ref 37–54)
EGFRCR SERPLBLD CKD-EPI 2021: 66.7 ML/MIN/1.73
EOSINOPHIL # BLD AUTO: 0.11 10*3/MM3 (ref 0–0.4)
EOSINOPHIL NFR BLD AUTO: 1.1 % (ref 0.3–6.2)
ERYTHROCYTE [DISTWIDTH] IN BLOOD BY AUTOMATED COUNT: 11.7 % (ref 12.3–15.4)
GLOBULIN UR ELPH-MCNC: 2 GM/DL
GLUCOSE BLDC GLUCOMTR-MCNC: 88 MG/DL (ref 70–130)
GLUCOSE SERPL-MCNC: 99 MG/DL (ref 65–99)
GLUCOSE UR STRIP-MCNC: NEGATIVE MG/DL
HCT VFR BLD AUTO: 39.1 % (ref 34–46.6)
HGB BLD-MCNC: 12.8 G/DL (ref 12–15.9)
HGB UR QL STRIP.AUTO: ABNORMAL
HOLD SPECIMEN: NORMAL
HYALINE CASTS UR QL AUTO: NORMAL /LPF
IMM GRANULOCYTES # BLD AUTO: 0.02 10*3/MM3 (ref 0–0.05)
IMM GRANULOCYTES NFR BLD AUTO: 0.2 % (ref 0–0.5)
INR PPP: 1
KETONES UR QL STRIP: ABNORMAL
LEUKOCYTE ESTERASE UR QL STRIP.AUTO: NEGATIVE
LYMPHOCYTES # BLD AUTO: 1.72 10*3/MM3 (ref 0.7–3.1)
LYMPHOCYTES NFR BLD AUTO: 16.7 % (ref 19.6–45.3)
MCH RBC QN AUTO: 30.6 PG (ref 26.6–33)
MCHC RBC AUTO-ENTMCNC: 32.7 G/DL (ref 31.5–35.7)
MCV RBC AUTO: 93.5 FL (ref 79–97)
MONOCYTES # BLD AUTO: 0.81 10*3/MM3 (ref 0.1–0.9)
MONOCYTES NFR BLD AUTO: 7.9 % (ref 5–12)
NEUTROPHILS NFR BLD AUTO: 7.6 10*3/MM3 (ref 1.7–7)
NEUTROPHILS NFR BLD AUTO: 74 % (ref 42.7–76)
NITRITE UR QL STRIP: NEGATIVE
PH UR STRIP.AUTO: <=5 [PH] (ref 5–8)
PLATELET # BLD AUTO: 309 10*3/MM3 (ref 140–450)
PMV BLD AUTO: 9.2 FL (ref 6–12)
POTASSIUM SERPL-SCNC: 4 MMOL/L (ref 3.5–5.2)
PROT SERPL-MCNC: 6.4 G/DL (ref 6–8.5)
PROT UR QL STRIP: ABNORMAL
PROTHROMBIN TIME: 12.7 SECONDS (ref 11–15)
RBC # BLD AUTO: 4.18 10*6/MM3 (ref 3.77–5.28)
RBC # UR STRIP: NORMAL /HPF
REF LAB TEST METHOD: NORMAL
SODIUM SERPL-SCNC: 139 MMOL/L (ref 136–145)
SP GR UR STRIP: 1.02 (ref 1–1.03)
SQUAMOUS #/AREA URNS HPF: NORMAL /HPF
UROBILINOGEN UR QL STRIP: ABNORMAL
WBC # UR STRIP: NORMAL /HPF
WBC NRBC COR # BLD AUTO: 10.27 10*3/MM3 (ref 3.4–10.8)
WHOLE BLOOD HOLD COAG: NORMAL
WHOLE BLOOD HOLD SPECIMEN: NORMAL

## 2024-12-07 PROCEDURE — 25510000001 IOPAMIDOL PER 1 ML: Performed by: EMERGENCY MEDICINE

## 2024-12-07 PROCEDURE — 36415 COLL VENOUS BLD VENIPUNCTURE: CPT

## 2024-12-07 PROCEDURE — 80053 COMPREHEN METABOLIC PANEL: CPT | Performed by: EMERGENCY MEDICINE

## 2024-12-07 PROCEDURE — 70496 CT ANGIOGRAPHY HEAD: CPT

## 2024-12-07 PROCEDURE — 99285 EMERGENCY DEPT VISIT HI MDM: CPT | Performed by: EMERGENCY MEDICINE

## 2024-12-07 PROCEDURE — 85025 COMPLETE CBC W/AUTO DIFF WBC: CPT | Performed by: EMERGENCY MEDICINE

## 2024-12-07 PROCEDURE — 70498 CT ANGIOGRAPHY NECK: CPT

## 2024-12-07 PROCEDURE — 81001 URINALYSIS AUTO W/SCOPE: CPT | Performed by: EMERGENCY MEDICINE

## 2024-12-07 PROCEDURE — 82948 REAGENT STRIP/BLOOD GLUCOSE: CPT

## 2024-12-07 PROCEDURE — 85610 PROTHROMBIN TIME: CPT

## 2024-12-07 PROCEDURE — 93005 ELECTROCARDIOGRAM TRACING: CPT | Performed by: EMERGENCY MEDICINE

## 2024-12-07 PROCEDURE — 93010 ELECTROCARDIOGRAM REPORT: CPT | Performed by: INTERNAL MEDICINE

## 2024-12-07 PROCEDURE — 71045 X-RAY EXAM CHEST 1 VIEW: CPT

## 2024-12-07 PROCEDURE — 0042T HC CT CEREBRAL PERFUSION W/WO CONTRAST: CPT

## 2024-12-07 PROCEDURE — 99285 EMERGENCY DEPT VISIT HI MDM: CPT

## 2024-12-07 RX ORDER — BISACODYL 10 MG
10 SUPPOSITORY, RECTAL RECTAL DAILY PRN
Status: DISCONTINUED | OUTPATIENT
Start: 2024-12-07 | End: 2024-12-09 | Stop reason: HOSPADM

## 2024-12-07 RX ORDER — POLYETHYLENE GLYCOL 3350 17 G/17G
17 POWDER, FOR SOLUTION ORAL DAILY PRN
Status: DISCONTINUED | OUTPATIENT
Start: 2024-12-07 | End: 2024-12-09 | Stop reason: HOSPADM

## 2024-12-07 RX ORDER — SODIUM CHLORIDE 0.9 % (FLUSH) 0.9 %
10 SYRINGE (ML) INJECTION AS NEEDED
Status: DISCONTINUED | OUTPATIENT
Start: 2024-12-07 | End: 2024-12-09 | Stop reason: HOSPADM

## 2024-12-07 RX ORDER — AMOXICILLIN 250 MG
2 CAPSULE ORAL 2 TIMES DAILY PRN
Status: DISCONTINUED | OUTPATIENT
Start: 2024-12-07 | End: 2024-12-09 | Stop reason: HOSPADM

## 2024-12-07 RX ORDER — PROPRANOLOL HYDROCHLORIDE 10 MG/1
10 TABLET ORAL 2 TIMES DAILY
COMMUNITY

## 2024-12-07 RX ORDER — ACETAMINOPHEN 325 MG/1
650 TABLET ORAL EVERY 4 HOURS PRN
Status: DISCONTINUED | OUTPATIENT
Start: 2024-12-07 | End: 2024-12-09 | Stop reason: HOSPADM

## 2024-12-07 RX ORDER — ROSUVASTATIN CALCIUM 5 MG/1
5 TABLET, COATED ORAL DAILY
Status: DISCONTINUED | OUTPATIENT
Start: 2024-12-08 | End: 2024-12-09 | Stop reason: HOSPADM

## 2024-12-07 RX ORDER — LOSARTAN POTASSIUM 25 MG/1
25 TABLET ORAL
Status: DISCONTINUED | OUTPATIENT
Start: 2024-12-08 | End: 2024-12-09 | Stop reason: HOSPADM

## 2024-12-07 RX ORDER — BISACODYL 5 MG/1
5 TABLET, DELAYED RELEASE ORAL DAILY PRN
Status: DISCONTINUED | OUTPATIENT
Start: 2024-12-07 | End: 2024-12-09 | Stop reason: HOSPADM

## 2024-12-07 RX ORDER — ONDANSETRON 2 MG/ML
4 INJECTION INTRAMUSCULAR; INTRAVENOUS EVERY 6 HOURS PRN
Status: DISCONTINUED | OUTPATIENT
Start: 2024-12-07 | End: 2024-12-09 | Stop reason: HOSPADM

## 2024-12-07 RX ORDER — VERAPAMIL HYDROCHLORIDE 240 MG/1
240 TABLET, FILM COATED, EXTENDED RELEASE ORAL
Status: DISCONTINUED | OUTPATIENT
Start: 2024-12-08 | End: 2024-12-09 | Stop reason: HOSPADM

## 2024-12-07 RX ORDER — ACETAMINOPHEN 650 MG/1
650 SUPPOSITORY RECTAL EVERY 4 HOURS PRN
Status: DISCONTINUED | OUTPATIENT
Start: 2024-12-07 | End: 2024-12-09 | Stop reason: HOSPADM

## 2024-12-07 RX ORDER — ONDANSETRON 4 MG/1
4 TABLET, ORALLY DISINTEGRATING ORAL EVERY 6 HOURS PRN
Status: DISCONTINUED | OUTPATIENT
Start: 2024-12-07 | End: 2024-12-09 | Stop reason: HOSPADM

## 2024-12-07 RX ORDER — ACETAMINOPHEN 160 MG/5ML
650 SOLUTION ORAL EVERY 4 HOURS PRN
Status: DISCONTINUED | OUTPATIENT
Start: 2024-12-07 | End: 2024-12-09 | Stop reason: HOSPADM

## 2024-12-07 RX ORDER — LEVOTHYROXINE SODIUM 25 UG/1
25 TABLET ORAL DAILY
Status: DISCONTINUED | OUTPATIENT
Start: 2024-12-08 | End: 2024-12-09 | Stop reason: HOSPADM

## 2024-12-07 RX ORDER — IOPAMIDOL 755 MG/ML
100 INJECTION, SOLUTION INTRAVASCULAR
Status: COMPLETED | OUTPATIENT
Start: 2024-12-07 | End: 2024-12-07

## 2024-12-07 RX ORDER — CALCIUM CARBONATE 500(1250)
1000 TABLET ORAL DAILY
Status: DISCONTINUED | OUTPATIENT
Start: 2024-12-08 | End: 2024-12-09 | Stop reason: HOSPADM

## 2024-12-07 RX ADMIN — IOPAMIDOL 100 ML: 755 INJECTION, SOLUTION INTRAVENOUS at 14:49

## 2024-12-07 NOTE — Clinical Note
Level of Care: Med/Surg [1]   Diagnosis: TGA (transient global amnesia) [182669]   Admitting Physician: LOWELL NELSON [[id number]]   Attending Physician: LOWELL NELSON [[id number]]   Isolate for COVID?: No [0]   Certification: I Certify That Inpatient Hospital Services Are Medically Necessary For Greater Than 2 Midnights

## 2024-12-07 NOTE — FSED PROVIDER NOTE
Subjective   History of Present Illness  70 yo female with memory loss per  since she returned home from [employer] around 1:15 pm today. Unclear if the symptoms started before that at [employer] or not.  was not at home when she left for [employer]. He did not receive any concerning phone calls from anyone from the [employer]. She had a similar episode about 10 years ago and it resolved  and later she saw a neurologist for follow up who told them if it ever happened again go to the ED. no known falls, headache, change in vision or change in speech.  No weakness as she is able to walk and interact and move appropriately.  No symptoms of any kind of illness.  What the  sees is that she cannot remember that she lost a family member a couple of months ago and the last time that she had this global amnesia she had lost family members as well.  She was fine this morning when she woke up and move around the kitchen and was active and normal.  She has been asking him questions because she does not remember the information.  She then forgets the information and asks again. Patient is supposed to start Propranolol for tremors (unclear if she did) and is already on Verapamil. PMH of hypothryoid, HTN, essential tremors, elevated lipids, disaccharide malabsorption, vertigo, anemia.  Last seen well by  was 9 AM when he left the house. Review of Propranolol only one tablet missing, of 60 tabs and picked up prescription Weds. Dr Krause offered this med as a patient request for help with the essential tremors..      Review of Systems    Past Medical History:   Diagnosis Date    Hypertension     Insomnia     Migraines        No Known Allergies    History reviewed. No pertinent surgical history.    Family History   Problem Relation Age of Onset    Macular degeneration Mother     Heart failure Father        Social History     Socioeconomic History    Marital status:    Tobacco Use    Smoking status: Former    Smokeless  tobacco: Never           Objective   Physical Exam  Vitals and nursing note reviewed.   Constitutional:       General: She is not in acute distress.     Appearance: Normal appearance. She is normal weight. She is not ill-appearing, toxic-appearing or diaphoretic.   HENT:      Head: Normocephalic and atraumatic.      Nose: Nose normal.      Mouth/Throat:      Mouth: Mucous membranes are moist. Mucous membranes are dry.      Pharynx: Oropharynx is clear.   Eyes:      Extraocular Movements: Extraocular movements intact.      Conjunctiva/sclera: Conjunctivae normal.      Pupils: Pupils are equal, round, and reactive to light.   Cardiovascular:      Rate and Rhythm: Normal rate and regular rhythm.      Pulses: Normal pulses.      Heart sounds: Normal heart sounds. No murmur heard.  Pulmonary:      Effort: Pulmonary effort is normal. No respiratory distress.      Breath sounds: Normal breath sounds. No stridor. No wheezing, rhonchi or rales.   Abdominal:      General: Abdomen is flat. There is no distension.      Palpations: Abdomen is soft.      Tenderness: There is no abdominal tenderness. There is no guarding.   Musculoskeletal:         General: Normal range of motion.      Cervical back: Normal range of motion and neck supple. No rigidity.      Right lower leg: No edema.      Left lower leg: No edema.   Lymphadenopathy:      Cervical: No cervical adenopathy.   Skin:     General: Skin is warm and dry.      Capillary Refill: Capillary refill takes less than 2 seconds.   Neurological:      General: No focal deficit present.      Mental Status: She is alert and oriented to person, place, and time. Mental status is at baseline.      Cranial Nerves: No cranial nerve deficit.      Motor: No weakness.      Coordination: Coordination normal.      Gait: Gait normal.      Comments: Procedure ovation and forgetfulness   Psychiatric:         Mood and Affect: Mood normal.         Behavior: Behavior normal.         Thought Content:  Thought content normal.         Judgment: Judgment normal.         Procedures           ED Course  ED Course as of 24 1649   Sat Dec 07, 2024   1431 NIH = 1 trouble remembering her age [AR]   1436 CBC with differential white blood count total is normal lymphocyte percentage is slightly low at 16.7 with the neutrophil absolute slightly elevated 7.60. [AR]   1514 PT/INR normal.  Urine shows moderate blood and 100 protein but no leukocytes or nitrites.  CMP does not show anything abnormal CBC with differential mentioned before. [AR]   1531 KG normal sinus rhythm rate 88 transition and R waves LVH possible inferior infarct that is old.  Nothing new.  No old EKG to compare to. [AR]   1532 Chest x-ray negative for pneumonia mass pleural effusion abnormalities like abnormal mediastinum and pneumothorax. [AR]   1534 Phone call report from Dr. Jose potts who read all the CTs and does not find anything concerning no occlusions no evidence of stroke at this time.  We discussed the normal chest x-ray and the symptoms that this workup prompted. [AR]   1541  says she is still repeating herself in asking questions especially about the family member who  within the past couple of months, behavior persistent questions about family members who have  similar to 10 years ago when had ' global amnesia.' [AR]   1547 Call out to neurology [AR]   1634 Dr ALEXSANDER Millard returned call (apparently service did not get correct number to him). D/w him current status and neg workup, BP, new meds (one tablet of propranolol), PMH, physically functional, agreed needed lab work, b vitamins, thyroid and MRI though unlikely a stroke. [AR]   1636 Spoke with Dr Rincon D/w her current status and neg workup, BP, new meds (one tablet of propranolol), PMH, physically functional, agreed to admit [AR]   1648 I discussed with patient   my discussions with Dr. Knapp and the neurologist [place], and their recommendations and  admission. Can go by POV with mild reservations once we have a bed assigned. [AR]      ED Course User Index  [AR] Sue Oglesby MD                          Total (NIH Stroke Scale): 1                Medical Decision Making  Differential for headache includes, but not limited to: migraine, TIA, subarachnoid or other hemorrhage, encephalitis, Lyme disease, carotid, basilar or vertebral artery dissection, venous thrombosis, headache related to concussion, hypoxia, hypercapnia, hypoglycemia, benign intracranial hypertension (pseudotumor cerebri), low cerebrospinal fluid pressure, substance abuse or withdrawal, malfunctioning  shunt.     Problems Addressed:  Memory deficit: complicated acute illness or injury    Amount and/or Complexity of Data Reviewed  Labs: ordered.  Radiology: ordered.  ECG/medicine tests: ordered.    Risk  Prescription drug management.  Decision regarding hospitalization.        Final diagnoses:   Memory deficit   TGA (transient global amnesia)       ED Disposition  ED Disposition       ED Disposition   Decision to Admit    Condition   --    Comment   Level of Care: Med/Surg [1]  Diagnosis: TGA (transient global amnesia) [913142]  Admitting Physician: LOWELL NELSON [[id number]]  Attending Physician: LOWELL NELSON [[id number]]  Isolate for COVID?: No [0]  Certification: I Certify That Inpatient  Hospital Services Are Medically Necessary For Greater Than 2 Midnights                 No follow-up provider specified.       Medication List      No changes were made to your prescriptions during this visit.

## 2024-12-08 ENCOUNTER — APPOINTMENT (OUTPATIENT)
Dept: NEUROLOGY | Facility: HOSPITAL | Age: 69
DRG: 072 | End: 2024-12-08
Payer: MEDICARE

## 2024-12-08 ENCOUNTER — APPOINTMENT (OUTPATIENT)
Dept: MRI IMAGING | Facility: HOSPITAL | Age: 69
DRG: 072 | End: 2024-12-08
Payer: MEDICARE

## 2024-12-08 PROBLEM — I10 HTN (HYPERTENSION): Status: ACTIVE | Noted: 2024-12-08

## 2024-12-08 PROBLEM — E78.5 HYPERLIPIDEMIA: Status: ACTIVE | Noted: 2024-12-08

## 2024-12-08 PROBLEM — E03.9 HYPOTHYROIDISM: Status: ACTIVE | Noted: 2024-12-08

## 2024-12-08 LAB
ANION GAP SERPL CALCULATED.3IONS-SCNC: 11 MMOL/L (ref 5–15)
BUN SERPL-MCNC: 16 MG/DL (ref 8–23)
BUN/CREAT SERPL: 21.6 (ref 7–25)
CALCIUM SPEC-SCNC: 9.1 MG/DL (ref 8.6–10.5)
CHLORIDE SERPL-SCNC: 104 MMOL/L (ref 98–107)
CO2 SERPL-SCNC: 26 MMOL/L (ref 22–29)
CREAT SERPL-MCNC: 0.74 MG/DL (ref 0.57–1)
DEPRECATED RDW RBC AUTO: 40.2 FL (ref 37–54)
EGFRCR SERPLBLD CKD-EPI 2021: 86.6 ML/MIN/1.73
ERYTHROCYTE [DISTWIDTH] IN BLOOD BY AUTOMATED COUNT: 12 % (ref 12.3–15.4)
FOLATE SERPL-MCNC: >20 NG/ML (ref 4.78–24.2)
GLUCOSE SERPL-MCNC: 87 MG/DL (ref 65–99)
HCT VFR BLD AUTO: 34.8 % (ref 34–46.6)
HGB BLD-MCNC: 11.8 G/DL (ref 12–15.9)
MCH RBC QN AUTO: 31.2 PG (ref 26.6–33)
MCHC RBC AUTO-ENTMCNC: 33.9 G/DL (ref 31.5–35.7)
MCV RBC AUTO: 92.1 FL (ref 79–97)
PLATELET # BLD AUTO: 267 10*3/MM3 (ref 140–450)
PMV BLD AUTO: 9.1 FL (ref 6–12)
POTASSIUM SERPL-SCNC: 3.9 MMOL/L (ref 3.5–5.2)
QT INTERVAL: 338 MS
QTC INTERVAL: 390 MS
RBC # BLD AUTO: 3.78 10*6/MM3 (ref 3.77–5.28)
SODIUM SERPL-SCNC: 141 MMOL/L (ref 136–145)
TSH SERPL DL<=0.05 MIU/L-ACNC: 1.74 UIU/ML (ref 0.27–4.2)
VIT B12 BLD-MCNC: 356 PG/ML (ref 211–946)
WBC NRBC COR # BLD AUTO: 8.31 10*3/MM3 (ref 3.4–10.8)

## 2024-12-08 PROCEDURE — 95816 EEG AWAKE AND DROWSY: CPT

## 2024-12-08 PROCEDURE — 82607 VITAMIN B-12: CPT | Performed by: STUDENT IN AN ORGANIZED HEALTH CARE EDUCATION/TRAINING PROGRAM

## 2024-12-08 PROCEDURE — 84443 ASSAY THYROID STIM HORMONE: CPT | Performed by: INTERNAL MEDICINE

## 2024-12-08 PROCEDURE — 70551 MRI BRAIN STEM W/O DYE: CPT

## 2024-12-08 PROCEDURE — 80048 BASIC METABOLIC PNL TOTAL CA: CPT | Performed by: INTERNAL MEDICINE

## 2024-12-08 PROCEDURE — 99221 1ST HOSP IP/OBS SF/LOW 40: CPT | Performed by: STUDENT IN AN ORGANIZED HEALTH CARE EDUCATION/TRAINING PROGRAM

## 2024-12-08 PROCEDURE — 95819 EEG AWAKE AND ASLEEP: CPT | Performed by: PSYCHIATRY & NEUROLOGY

## 2024-12-08 PROCEDURE — 85027 COMPLETE CBC AUTOMATED: CPT | Performed by: INTERNAL MEDICINE

## 2024-12-08 PROCEDURE — 83921 ORGANIC ACID SINGLE QUANT: CPT | Performed by: STUDENT IN AN ORGANIZED HEALTH CARE EDUCATION/TRAINING PROGRAM

## 2024-12-08 PROCEDURE — 82746 ASSAY OF FOLIC ACID SERUM: CPT | Performed by: STUDENT IN AN ORGANIZED HEALTH CARE EDUCATION/TRAINING PROGRAM

## 2024-12-08 RX ADMIN — ROSUVASTATIN CALCIUM 5 MG: 5 TABLET, FILM COATED ORAL at 09:41

## 2024-12-08 RX ADMIN — ACETAMINOPHEN 325MG 325 MG: 325 TABLET ORAL at 18:16

## 2024-12-08 RX ADMIN — LOSARTAN POTASSIUM 25 MG: 25 TABLET, FILM COATED ORAL at 08:25

## 2024-12-08 RX ADMIN — LEVOTHYROXINE SODIUM 25 MCG: 25 TABLET ORAL at 08:25

## 2024-12-08 RX ADMIN — Medication 1000 MG: at 08:25

## 2024-12-08 NOTE — NURSING NOTE
Pt arrived to [address] [place] [address] around 1811 from the ER. Pt is alert and able to answer all questions. Pt able to ambulate around her room,  at bedside. Initial assessment completed by DONALDO Kemp RN. Call light and personal items in reach.

## 2024-12-08 NOTE — CONSULTS
"Neurology Consult Note    Consult Date: 12/8/2024    Referring MD: Provider, No Known    Reason for Consult I have been asked to see the patient in neurological consultation to render advice and opinion regarding transient memory loss    Aracelis Arechiga is a 69 y.o. right-handed white female with known diagnosis of hypertension, mixed hyperlipidemia, migraine headache presented to the hospital for evaluation of transient memory loss.  The patient stated that she went to [place] yesterday and at 10 she started having issues with short-term memory, symptoms lasted the entire day yesterday, she went to [facility] blood pressure was elevated 150/108.  Neurology consulted for the transient memory issues.  She had CT angio head and neck that showed no significant stenosis or occlusion.  She had an MRI brain that I personally reviewed and showed no acute abnormality.  Patient stated that couple of her family passed out her aunt and her cousin few months ago.  She stated that 20 years ago she had similar episode of transient memory issues when her Niece commit suicide.  She denies smoking drinking or drug illicit.   at the bedside stated that sometimes the patient would have issues remembering things such as remembering taking her medications.  She does have family history of dementia in her aunt and cousin.    Past Medical History:   Diagnosis Date    Arthritis     Disease of thyroid gland     Hyperlipidemia     Hypertension     Insomnia     Migraines        Exam  /79 (BP Location: Right arm, Patient Position: Sitting)   Pulse 89   Temp 99.1 °F (37.3 °C) (Oral)   Resp 16   Ht 152.4 cm (60\")   Wt 63.5 kg (139 lb 15.9 oz)   SpO2 98%   BMI 27.34 kg/m²   Gen: NAD, vitals reviewed  MS: oriented x3, recent/remote memory impaired remembered 2/3 objects after 5 minutes, normal attention/concentration, language intact, no neglect.  CN: visual acuity grossly normal, PERRL, EOMI, no facial droop, no dysarthria  Motor: " 5/5 throughout upper and lower extremities, normal tone    DATA:    Lab Results   Component Value Date    GLUCOSE 87 12/08/2024    CALCIUM 9.1 12/08/2024     12/08/2024    K 3.9 12/08/2024    CO2 26.0 12/08/2024     12/08/2024    BUN 16 12/08/2024    CREATININE 0.74 12/08/2024    EGFRIFAFRI 90 04/02/2021    EGFRIFNONA 50 (L) 09/15/2021    BCR 21.6 12/08/2024    ANIONGAP 11.0 12/08/2024     Lab Results   Component Value Date    WBC 8.31 12/08/2024    HGB 11.8 (L) 12/08/2024    HCT 34.8 12/08/2024    MCV 92.1 12/08/2024     12/08/2024       Lab review: TSH normal, above labs reviewed.    Imaging review: I personally reviewed the MRI brain which showed no acute abnormality.  CT angio head and neck with no significant stenosis or occlusion, fetal PCA on the left.      CTA head and neck, CT perfusion:  IMPRESSION:     1. No acute intracranial process.  2. No core or ischemia on CT perfusion.  3. No large vessel occlusion, aneurysm or dissection seen.    MRI brain:  FINDINGS:      Brain: No intraparenchymal hemorrhage. Chronic small vessel ischemic  changes. Preserved gray-white differentiation. No mass effect or midline  shift. No diffusion restriction.     Ventricles: Ventriculomegaly, suspect ex vacuo dilatation from central  volume loss. No intraventricular hemorrhage.     Extra-axial spaces: No extra-axial hemorrhage or fluid collection seen.  Normal flow voids.     Osseous structures: No bone lesion seen.     Sinuses: Mastoid air cells and middle ears appear patent. Mucosal  thickening in the ethmoid air cells and occlusion of the left frontal  recess.     IMPRESSION:  No acute findings.    Diagnoses:  -Transient memory loss likely from transient global amnesia secondary to hypertension/stress  -Rule out underlying cognitive impairment/dementia  -Rule out seizures although felt less likely.      PLAN:   -Check B12, folate, methylmalonic acid  -Routine EEG  -Blood pressure goal less than 140/90 for  "the long-term, monitor blood pressure at home and discuss with PCP to adjust/add more blood pressure medications if indicated.  -If memory issues continue to be a problem can follow-up with general neurology as an outpatient for neuropsych assessment  -I will follow peripherally on the above otherwise no further workup needed.    Medical Decision Making for this neurology consultation consists of the following:  Review of previous chart, including H/P, provider and nursing notes as applicable.  Review of medications and vital signs.  Review of previous labs, neuroimaging, and additional relevant diagnostics.   Interpretation of laboratory, imaging, and other diagnostic results  Discussion with other providers and family   Total face-to-face/floor time: 30-61 minutes.     \"Dictated utilizing Dragon dictation\".     " Detail Level: Simple Patient Specific Counseling (Will Not Stick From Patient To Patient): Instructed patient to call the office for Rx to treat pruritus to the scalp. Ketoconazole has not worked for her in that past. Detail Level: Generalized

## 2024-12-08 NOTE — PROGRESS NOTES
Name: Aracelis Arechiga ADMIT: 2024   : 1955  PCP: Fransico Lopez MD    MRN: [id number] LOS: 1 days   AGE/SEX: 69 y.o. female  ROOM: [facility]     Subjective   Subjective   Currently without complaint. She states she remembers parking her car but does not remember luncheon or driving home.  She remembers is being in the hospital last night.     Objective   Objective   Vital Signs  Temp:  [97.9 °F (36.6 °C)-98.1 °F (36.7 °C)] 97.9 °F (36.6 °C)  Heart Rate:  [73-91] 73  Resp:  [15-18] 16  BP: (103-150)/() 103/78  SpO2:  [90 %-99 %] 95 %  on   ;   Device (Oxygen Therapy): room air  Body mass index is 27.34 kg/m².    Physical Exam  Constitutional:       General: She is not in acute distress.     Appearance: She is not toxic-appearing.   HENT:      Head: Normocephalic and atraumatic.   Cardiovascular:      Rate and Rhythm: Normal rate and regular rhythm.   Pulmonary:      Effort: Pulmonary effort is normal. No respiratory distress.      Breath sounds: Normal breath sounds. No wheezing or rhonchi.   Abdominal:      General: Bowel sounds are normal.      Palpations: Abdomen is soft.      Tenderness: There is no abdominal tenderness. There is no guarding or rebound.   Musculoskeletal:         General: No swelling.   Skin:     General: Skin is warm and dry.   Neurological:      General: No focal deficit present.      Mental Status: She is alert and oriented to person, place, and time.   Psychiatric:         Mood and Affect: Mood normal.         Behavior: Behavior normal.     Interval: baseline  1a. Level of Consciousness: 0-->Alert, keenly responsive  1b. LOC Questions: 0-->Answers both questions correctly  1c. LOC Commands: 0-->Performs both tasks correctly  2. Best Gaze: 0-->Normal  3. Visual: 0-->No visual loss  4. Facial Palsy: 0-->Normal symmetrical movements  5a. Motor Arm, Left: 0-->No drift, limb holds 90 (or 45) degrees for full 10 secs  5b. Motor Arm, Right: 0-->No drift, limb holds 90 (or 45) degrees  for full 10 secs  6a. Motor Leg, Left: 0-->No drift, leg holds 30 degree position for full 5 secs  6b. Motor Leg, Right: 0-->No drift, leg holds 30 degree position for full 5 secs  7. Limb Ataxia: 0-->Absent  8. Sensory: 0-->Normal, no sensory loss  9. Best Language: 0-->No aphasia, normal  10. Dysarthria: 0-->Normal  11. Extinction and Inattention (formerly Neglect): 0-->No abnormality    Total (NIH Stroke Scale): 0     Results Review  I reviewed the patient's new clinical results.  Results from last 7 days   Lab Units 12/08/24  0556 12/07/24  1430   WBC 10*3/mm3 8.31 10.27   HEMOGLOBIN g/dL 11.8* 12.8   PLATELETS 10*3/mm3 267 309     Results from last 7 days   Lab Units 12/08/24  0556 12/07/24  1430   SODIUM mmol/L 141 139   POTASSIUM mmol/L 3.9 4.0   CHLORIDE mmol/L 104 105   CO2 mmol/L 26.0 25.8   BUN mg/dL 16 22   CREATININE mg/dL 0.74 0.92   GLUCOSE mg/dL 87 99     Lab Results   Component Value Date    ANIONGAP 11.0 12/08/2024     Estimated Creatinine Clearance: 58 mL/min (by C-G formula based on SCr of 0.74 mg/dL).   Lab Results   Component Value Date    EGFR 86.6 12/08/2024     Results from last 7 days   Lab Units 12/07/24  1430   ALBUMIN g/dL 4.4   BILIRUBIN mg/dL 0.3   ALK PHOS U/L 78   AST (SGOT) U/L 28   ALT (SGPT) U/L 23     Results from last 7 days   Lab Units 12/08/24  0556 12/07/24  1430   CALCIUM mg/dL 9.1 9.6   ALBUMIN g/dL  --  4.4       Glucose   Date/Time Value Ref Range Status   12/07/2024 1506 88 70 - 130 mg/dL Final       CT CEREBRAL PERFUSION WITH & WITHOUT CONTRAST    Result Date: 12/7/2024   1. No acute intracranial process. 2. No core or ischemia on CT perfusion. 3. No large vessel occlusion, aneurysm or dissection seen.  Call Report: Dr. Oglesby was notified by telephone of the above findings on December 7, 2024 at 3:34 p.m.  This report was finalized on 12/7/2024 3:35 PM by Dr. Ceasar Garcia M.D on Workstation: [id number]      CT Angiogram Neck    Result Date: 12/7/2024   1. No acute  intracranial process. 2. No core or ischemia on CT perfusion. 3. No large vessel occlusion, aneurysm or dissection seen.  Call Report: Dr. Oglesby was notified by telephone of the above findings on December 7, 2024 at 3:34 p.m.  This report was finalized on 12/7/2024 3:35 PM by Dr. Ceasar Garcia M.D on Workstation: [id number]      CT Angiogram Head w AI Analysis of LVO    Result Date: 12/7/2024   1. No acute intracranial process. 2. No core or ischemia on CT perfusion. 3. No large vessel occlusion, aneurysm or dissection seen.  Call Report: Dr. Oglesby was notified by telephone of the above findings on December 7, 2024 at 3:34 p.m.  This report was finalized on 12/7/2024 3:35 PM by Dr. Ceasar Garcia M.D on Workstation: [id number]       Scheduled Meds  calcium carbonate (oyster shell), 1,000 mg, Oral, Daily  levothyroxine, 25 mcg, Oral, Daily  losartan, 25 mg, Oral, Q24H  rosuvastatin, 5 mg, Oral, Daily  verapamil SR, 240 mg, Oral, Q24H    Continuous Infusions   PRN Meds  •  acetaminophen **OR** acetaminophen **OR** acetaminophen  •  senna-docusate sodium **AND** polyethylene glycol **AND** bisacodyl **AND** bisacodyl  •  melatonin  •  ondansetron ODT **OR** ondansetron  •  sodium chloride     Diet  Diet: Cardiac; Healthy Heart (2-3 Na+); Fluid Consistency: Thin (IDDSI 0)       Assessment/Plan     Active Hospital Problems    Diagnosis  POA   • **TGA (transient global amnesia) [G45.4]  Yes   • HTN (hypertension) [I10]  Unknown   • Hyperlipidemia [E78.5]  Unknown   • Hypothyroidism [E03.9]  Unknown      Resolved Hospital Problems   No resolved problems to display.     Patient is a 69 y.o. female     TGA  Chest x-ray negative, CTA head and neck negative  No evidence of infection  MRI pending  Neurology consulted  She reports similar episode about 20 years ago    Hypertension losartan, verapamil  Hyperlipidemia rosuvastatin  Hypothyroidism levothyroxine. TSH okay    DVT prophylaxis  SCDs    Discharge  TBD  Expected  discharge date/ time has not been documented.    Discussed with patient and nursing staff    Flo Amado MD  [facility]  12/08/24

## 2024-12-08 NOTE — NURSING NOTE
Admission completed with NIH 0, passed swallow study.  notes she was started on new medication Propanolol for tremors and amnesia symptoms started after 1 dose. Unsure if related.     Oriented to the floor

## 2024-12-08 NOTE — PLAN OF CARE
Problem: Adult Inpatient Plan of Care  Goal: Plan of Care Review  Outcome: Progressing  Flowsheets (Taken 12/8/2024 1853)  Progress: improving  Plan of Care Reviewed With:   patient   spouse     Problem: Adult Inpatient Plan of Care  Goal: Optimal Comfort and Wellbeing  Outcome: Progressing  Intervention: Provide Person-Centered Care  Recent Flowsheet Documentation  Taken 12/8/2024 1630 by Laurie Kemp RN  Trust Relationship/Rapport:   emotional support provided   empathic listening provided  Taken 12/8/2024 0715 by Laurie Kemp RN  Trust Relationship/Rapport:   empathic listening provided   reassurance provided     Problem: Stroke, Ischemic (Includes Transient Ischemic Attack)  Goal: Optimal Cognitive Function  Outcome: Progressing  Intervention: Optimize Cognitive Function  Recent Flowsheet Documentation  Taken 12/8/2024 0715 by Laurie Kemp RN  Sensory Stimulation Regulation:   lighting decreased   quiet environment promoted   television on  Reorientation Measures:   clock in view   glasses use encouraged   Goal Outcome Evaluation:  Plan of Care Reviewed With: patient, spouse        Progress: improving          Patient has been alert and Oriented X 4 today but still amnesia on recent events, like her mind is not recording. Independent in room and bathroom,  at bedside throughout the day.   MRI was negative, EEG was completed, seen by Neuro today see note.   Anticipate discharge 12/9 tomorrow if no further findings.     She did request 1 tylenol in am for back and 1 tylenol in pm for mild headache, declined scheduled 2 tylenols in am.

## 2024-12-08 NOTE — H&P
HISTORY AND PHYSICAL   [facility]        Date of Admission: 2024  Patient Identification:  Name: Aracelis Arechiga  Age: 69 y.o.  Sex: female  :  1955  MRN: [id number]                     Primary Care Physician: Fransico Lopez MD    Chief Complaint:  69 year old female presented to the emergency room at [facility] with her ; he noted that she had acute memory loss after she returned from [facility]; no known injury; no recent illness; she had one prior episode ten years ago after a family member  which resolved on its own; she does not remember why she has a prescription for propranolol and says that dr hector is not her regular neurologist; no visitors are present with her  History of Present Illness:   As above    Past Medical History:  Past Medical History:   Diagnosis Date    Arthritis     Disease of thyroid gland     Hyperlipidemia     Hypertension     Insomnia     Migraines      Past Surgical History:  History reviewed. No pertinent surgical history.   Home Meds:  Medications Prior to Admission   Medication Sig Dispense Refill Last Dose/Taking    Calcium Carbonate (CALCIUM 600) 1500 (600 Ca) MG tablet Take 1 tablet by mouth Daily.   Past Week    Coenzyme Q10 (COQ10) 200 MG capsule Take  by mouth.   Past Week    fluticasone (FLONASE) 50 MCG/ACT nasal spray Administer 2 sprays into the nostril(s) as directed by provider Daily.   Past Week    folic acid (FOLVITE) 400 MCG tablet Take 1 tablet by mouth Daily.   Past Week    irbesartan (AVAPRO) 75 MG tablet    2024    levothyroxine (SYNTHROID, LEVOTHROID) 25 MCG tablet TAKE ONE TABLET BY MOUTH DAILY 90 tablet 0 2024    Misc Natural Products (LUTEIN 20 PO) Take  by mouth.   Past Week    Omega-3 Fatty Acids (FISH OIL) 1000 MG capsule capsule Take  by mouth Daily With Breakfast.   Past Week    rosuvastatin (CRESTOR) 5 MG tablet Take 1 tablet by mouth Daily.   2024    SUMAtriptan (IMITREX) 100 MG tablet    Past Week     verapamil SR (CALAN-SR) 240 MG CR tablet    2024    propranolol (INDERAL) 10 MG tablet Take 1 tablet by mouth 2 (Two) Times a Day.          Allergies:  No Known Allergies  Immunizations:  Immunization History   Administered Date(s) Administered    COVID-19 (MODERNA) BIVALENT 12+YRS 2022    COVID-19 (PFIZER) 12YRS+ (COMIRNATY) 2023, 2024    COVID-19 (PFIZER) Purple Cap Monovalent 2021, 2021, 10/31/2021    Covid-19 (Pfizer) Gray Cap Monovalent 2022     Social History:   Social History     Social History Narrative    Not on file     Social History     Socioeconomic History    Marital status:    Tobacco Use    Smoking status: Former    Smokeless tobacco: Never    Tobacco comments:     No smoker for 20 years    Vaping Use    Vaping status: Never Used   Substance and Sexual Activity    Alcohol use: Never    Drug use: Never       Family History:  Family History   Problem Relation Age of Onset    Macular degeneration Mother     Heart failure Father         Review of Systems  See history of present illness and past medical history.  Patient denies headache, dizziness, syncope, falls, trauma, change in vision, change in hearing, change in taste, changes in weight, changes in appetite, focal weakness, numbness, or paresthesia.  Patient denies chest pain, palpitations, dyspnea, orthopnea, PND, cough, sinus pressure, rhinorrhea, epistaxis, hemoptysis, nausea, vomiting,hematemesis, diarrhea, constipation or hematochezia.  Denies cold or heat intolerance, polydipsia, polyuria, polyphagia. Denies hematuria, pyuria, dysuria, hesitancy, frequency or urgency. Denies consumption of raw and under cooked meats foods or change in water source.  Denies fever, chills, sweats, night sweats.  Denies missing any routine medications. Remainder of ROS is negative.    Objective:  T Max 24 hrs: Temp (24hrs), Av °F (36.7 °C), Min:97.9 °F (36.6 °C), Max:98.1 °F (36.7 °C)    Vitals Ranges:   Temp:   "[97.9 °F (36.6 °C)-98.1 °F (36.7 °C)] 98.1 °F (36.7 °C)  Heart Rate:  [76-91] 76  Resp:  [15-18] 18  BP: (117-150)/() 126/95      Exam:  /95 (BP Location: Right arm, Patient Position: Lying)   Pulse 76   Temp 98.1 °F (36.7 °C) (Oral)   Resp 18   Ht 152.4 cm (60\")   Wt 63.5 kg (140 lb)   SpO2 98%   BMI 27.34 kg/m²     General Appearance:    Alert, cooperative, no distress, appears stated age   Head:    Normocephalic, without obvious abnormality, atraumatic   Eyes:    PERRL, conjunctivae/corneas clear, EOM's intact, both eyes   Ears:    Normal external ear canals, both ears   Nose:   Nares normal, septum midline, mucosa normal, no drainage    or sinus tenderness   Throat:   Lips, mucosa, and tongue normal   Neck:   Supple, symmetrical, trachea midline, no adenopathy;     thyroid:  no enlargement/tenderness/nodules; no carotid    bruit or JVD   Back:     Symmetric, no curvature, ROM normal, no CVA tenderness   Lungs:     Clear to auscultation bilaterally, respirations unlabored   Chest Wall:    No tenderness or deformity    Heart:    Regular rate and rhythm, S1 and S2 normal, no murmur, rub   or gallop   Abdomen:     Soft, nontender, bowel sounds active all four quadrants,     no masses, no hepatomegaly, no splenomegaly   Extremities:   Extremities normal, atraumatic, no cyanosis or edema                       .    Data Review:  Labs in chart were reviewed.  WBC   Date Value Ref Range Status   12/07/2024 10.27 3.40 - 10.80 10*3/mm3 Final     Hemoglobin   Date Value Ref Range Status   12/07/2024 12.8 12.0 - 15.9 g/dL Final     Hematocrit   Date Value Ref Range Status   12/07/2024 39.1 34.0 - 46.6 % Final     Platelets   Date Value Ref Range Status   12/07/2024 309 140 - 450 10*3/mm3 Final     Sodium   Date Value Ref Range Status   12/07/2024 139 136 - 145 mmol/L Final     Potassium   Date Value Ref Range Status   12/07/2024 4.0 3.5 - 5.2 mmol/L Final     Chloride   Date Value Ref Range Status "   12/07/2024 105 98 - 107 mmol/L Final     CO2   Date Value Ref Range Status   12/07/2024 25.8 22.0 - 29.0 mmol/L Final     BUN   Date Value Ref Range Status   12/07/2024 22 8 - 23 mg/dL Final     Creatinine   Date Value Ref Range Status   12/07/2024 0.92 0.57 - 1.00 mg/dL Final     Glucose   Date Value Ref Range Status   12/07/2024 99 65 - 99 mg/dL Final     Calcium   Date Value Ref Range Status   12/07/2024 9.6 8.6 - 10.5 mg/dL Final                Imaging Results (All)       Procedure Component Value Units Date/Time    CT CEREBRAL PERFUSION WITH & WITHOUT CONTRAST [[id number]] Collected: 12/07/24 1508     Updated: 12/07/24 1538    Narrative:      CT ANGIOGRAM HEAD W AI ANALYSIS OF LVO-, CT ANGIOGRAM NECK-, CT CEREBRAL  PERFUSION W WO CONTRAST-     INDICATION: Neuro deficit. Stroke suspected.     COMPARISON: None     TECHNIQUE:  CTA head and neck with IV contrast. Noncontrast head CT. CT perfusion  with rapid analysis. Coronal and sagittal reformats. Three dimensional  reconstructions. Radiation dose reduction techniques were utilized,  including automated exposure control and exposure modulation based on  body size.     FINDINGS:      Noncontrast head CT: No intraparenchymal hemorrhage. Preserved  gray-white differentiation. Chronic small vessel ischemic changes. No  mass effect or midline shift. Mild ventriculomegaly, suspect ex vacuo  dilatation from central volume loss. No intraventricular hemorrhage. No  extra-axial hemorrhage or fluid collection seen. No fracture or bone  lesion. Patent mastoid air cells and middle ears. Possible mucous  retention cyst in the left frontal recess.     CTA NECK: Right common and internal carotid artery are patent, without  stenosis. Tortuous right internal carotid artery. Streak artifact from  contrast in the venous system obscures the central left common carotid  artery just distal to the arch, with the vessel otherwise appearing  patent, without stenosis seen. Left internal  carotid artery is patent,  without stenosis. Tortuous left internal carotid artery. Bilateral  vertebral arteries appear patent, without stenosis.     CTA HEAD: Right internal carotid, middle cerebral and anterior cerebral  arteries are patent, without stenosis. Left internal carotid, middle  cerebral and anterior cerebral arteries are patent, without stenosis.  Bilateral vertebral arteries, posterior inferior cerebellar arteries,  basilar artery, superior cerebellar arteries and posterior cerebral  arteries appear patent, without stenosis. Persistent fetal circulation  with the left posterior cerebral artery supplied by a left posterior  communicating artery. Dural sinuses appear patent. No aneurysm  identified.     CT PERFUSION: CBF less than 30%: 0 mL. Tmax greater than 6 seconds: 0  mL. No core or ischemia.          Impression:         1. No acute intracranial process.  2. No core or ischemia on CT perfusion.  3. No large vessel occlusion, aneurysm or dissection seen.     Call Report: Dr. Oglesby was notified by telephone of the above findings  on December 7, 2024 at 3:34 p.m.     This report was finalized on 12/7/2024 3:35 PM by Dr. Ceasar Garcia M.D on Workstation: [id number]       CT Angiogram Neck [[id number]] Collected: 12/07/24 1508     Updated: 12/07/24 1538    Narrative:      CT ANGIOGRAM HEAD W AI ANALYSIS OF LVO-, CT ANGIOGRAM NECK-, CT CEREBRAL  PERFUSION W WO CONTRAST-     INDICATION: Neuro deficit. Stroke suspected.     COMPARISON: None     TECHNIQUE:  CTA head and neck with IV contrast. Noncontrast head CT. CT perfusion  with rapid analysis. Coronal and sagittal reformats. Three dimensional  reconstructions. Radiation dose reduction techniques were utilized,  including automated exposure control and exposure modulation based on  body size.     FINDINGS:      Noncontrast head CT: No intraparenchymal hemorrhage. Preserved  gray-white differentiation. Chronic small vessel ischemic changes. No  mass  effect or midline shift. Mild ventriculomegaly, suspect ex vacuo  dilatation from central volume loss. No intraventricular hemorrhage. No  extra-axial hemorrhage or fluid collection seen. No fracture or bone  lesion. Patent mastoid air cells and middle ears. Possible mucous  retention cyst in the left frontal recess.     CTA NECK: Right common and internal carotid artery are patent, without  stenosis. Tortuous right internal carotid artery. Streak artifact from  contrast in the venous system obscures the central left common carotid  artery just distal to the arch, with the vessel otherwise appearing  patent, without stenosis seen. Left internal carotid artery is patent,  without stenosis. Tortuous left internal carotid artery. Bilateral  vertebral arteries appear patent, without stenosis.     CTA HEAD: Right internal carotid, middle cerebral and anterior cerebral  arteries are patent, without stenosis. Left internal carotid, middle  cerebral and anterior cerebral arteries are patent, without stenosis.  Bilateral vertebral arteries, posterior inferior cerebellar arteries,  basilar artery, superior cerebellar arteries and posterior cerebral  arteries appear patent, without stenosis. Persistent fetal circulation  with the left posterior cerebral artery supplied by a left posterior  communicating artery. Dural sinuses appear patent. No aneurysm  identified.     CT PERFUSION: CBF less than 30%: 0 mL. Tmax greater than 6 seconds: 0  mL. No core or ischemia.          Impression:         1. No acute intracranial process.  2. No core or ischemia on CT perfusion.  3. No large vessel occlusion, aneurysm or dissection seen.     Call Report: Dr. Oglesby was notified by telephone of the above findings  on December 7, 2024 at 3:34 p.m.     This report was finalized on 12/7/2024 3:35 PM by Dr. Ceasar Garcia M.D on Workstation: [id number]       CT Angiogram Head w AI Analysis of LVO [[id number]] Collected: 12/07/24 1501      Updated: 12/07/24 1538    Narrative:      CT ANGIOGRAM HEAD W AI ANALYSIS OF LVO-, CT ANGIOGRAM NECK-, CT CEREBRAL  PERFUSION W WO CONTRAST-     INDICATION: Neuro deficit. Stroke suspected.     COMPARISON: None     TECHNIQUE:  CTA head and neck with IV contrast. Noncontrast head CT. CT perfusion  with rapid analysis. Coronal and sagittal reformats. Three dimensional  reconstructions. Radiation dose reduction techniques were utilized,  including automated exposure control and exposure modulation based on  body size.     FINDINGS:      Noncontrast head CT: No intraparenchymal hemorrhage. Preserved  gray-white differentiation. Chronic small vessel ischemic changes. No  mass effect or midline shift. Mild ventriculomegaly, suspect ex vacuo  dilatation from central volume loss. No intraventricular hemorrhage. No  extra-axial hemorrhage or fluid collection seen. No fracture or bone  lesion. Patent mastoid air cells and middle ears. Possible mucous  retention cyst in the left frontal recess.     CTA NECK: Right common and internal carotid artery are patent, without  stenosis. Tortuous right internal carotid artery. Streak artifact from  contrast in the venous system obscures the central left common carotid  artery just distal to the arch, with the vessel otherwise appearing  patent, without stenosis seen. Left internal carotid artery is patent,  without stenosis. Tortuous left internal carotid artery. Bilateral  vertebral arteries appear patent, without stenosis.     CTA HEAD: Right internal carotid, middle cerebral and anterior cerebral  arteries are patent, without stenosis. Left internal carotid, middle  cerebral and anterior cerebral arteries are patent, without stenosis.  Bilateral vertebral arteries, posterior inferior cerebellar arteries,  basilar artery, superior cerebellar arteries and posterior cerebral  arteries appear patent, without stenosis. Persistent fetal circulation  with the left posterior cerebral artery  supplied by a left posterior  communicating artery. Dural sinuses appear patent. No aneurysm  identified.     CT PERFUSION: CBF less than 30%: 0 mL. Tmax greater than 6 seconds: 0  mL. No core or ischemia.          Impression:         1. No acute intracranial process.  2. No core or ischemia on CT perfusion.  3. No large vessel occlusion, aneurysm or dissection seen.     Call Report: Dr. Oglesby was notified by telephone of the above findings  on December 7, 2024 at 3:34 p.m.     This report was finalized on 12/7/2024 3:35 PM by Dr. Ceasar Garcia M.D on Workstation: [id number]       XR Chest 1 View [[id number]] Collected: 12/07/24 1525     Updated: 12/07/24 1529    Narrative:      XR CHEST 1 VW-     HISTORY: 69-year-old female with confusion.     FINDINGS: No evidence for pneumonia or CHF.     This report was finalized on 12/7/2024 3:26 PM by Dr. Agatha Boyd M.D on  Workstation: [id number]                 Assessment:  Active Hospital Problems    Diagnosis  POA    **TGA (transient global amnesia) [G45.4]  Yes      Resolved Hospital Problems   No resolved problems to display.   Hypothyroidism  Hypertension  hyperlipidemia    Plan:  Mri  Neurology to see  Monitor on telemetry  Trend labs   patient and ed provider at [facility] Eladio Rincon MD  12/7/2024  22:41 EST

## 2024-12-08 NOTE — PLAN OF CARE
Problem: Adult Inpatient Plan of Care  Goal: Plan of Care Review  Outcome: Progressing  Flowsheets (Taken 12/8/2024 0628)  Plan of Care Reviewed With: patient  Goal: Patient-Specific Goal (Individualized)  Outcome: Progressing  Goal: Absence of Hospital-Acquired Illness or Injury  Outcome: Progressing  Intervention: Identify and Manage Fall Risk  Recent Flowsheet Documentation  Taken 12/8/2024 0624 by Kellie Schreiber RN  Safety Promotion/Fall Prevention: safety round/check completed  Taken 12/8/2024 0449 by Kellie Schreiber RN  Safety Promotion/Fall Prevention: safety round/check completed  Taken 12/8/2024 0210 by Kellie Schreiber RN  Safety Promotion/Fall Prevention: safety round/check completed  Taken 12/8/2024 0049 by Kellie Schreiber RN  Safety Promotion/Fall Prevention: safety round/check completed  Taken 12/7/2024 2200 by Kellie Schreiber RN  Safety Promotion/Fall Prevention: safety round/check completed  Intervention: Prevent Skin Injury  Recent Flowsheet Documentation  Taken 12/8/2024 0624 by Kellie Schreiber RN  Body Position: turned  Taken 12/8/2024 0449 by Kellie Schreiber RN  Body Position:   position changed independently   side-lying   right  Taken 12/8/2024 0210 by Kellie Schreiber RN  Body Position:   position changed independently   side-lying   left  Taken 12/8/2024 0049 by Kellie Schreiber RN  Body Position: turned  Taken 12/7/2024 2200 by Kellie Schreiber RN  Body Position: turned  Goal: Optimal Comfort and Wellbeing  Outcome: Progressing  Goal: Readiness for Transition of Care  Outcome: Progressing     Problem: Comorbidity Management  Goal: Blood Pressure in Desired Range  Outcome: Progressing  Intervention: Maintain Blood Pressure Management  Recent Flowsheet Documentation  Taken 12/8/2024 0624 by Kellie Schreiber RN  Medication Review/Management: medications reviewed  Taken 12/8/2024 0449 by Kellie Schreiber RN  Medication Review/Management: medications reviewed  Taken 12/8/2024 0210 by  Kellie Schreiber RN  Medication Review/Management: medications reviewed  Taken 12/8/2024 0049 by Kellie Schreiber RN  Medication Review/Management: medications reviewed  Taken 12/7/2024 2200 by Kellie Schreiber RN  Medication Review/Management: medications reviewed  Goal: Maintenance of Osteoarthritis Symptom Control  Outcome: Progressing  Intervention: Maintain Osteoarthritis Symptom Control  Recent Flowsheet Documentation  Taken 12/8/2024 0624 by Kellie Schreiber RN  Medication Review/Management: medications reviewed  Taken 12/8/2024 0449 by Kellie Schreiber RN  Medication Review/Management: medications reviewed  Taken 12/8/2024 0210 by Kellie Schreiber RN  Medication Review/Management: medications reviewed  Taken 12/8/2024 0049 by Kellie Schreiber RN  Medication Review/Management: medications reviewed  Taken 12/7/2024 2200 by Kellie Schreiber RN  Medication Review/Management: medications reviewed  Taken 12/7/2024 2055 by Kellie Schreiber RN  Activity Management: ambulated in room   Goal Outcome Evaluation:  Plan of Care Reviewed With: patient

## 2024-12-09 VITALS
RESPIRATION RATE: 18 BRPM | WEIGHT: 139.99 LBS | HEART RATE: 86 BPM | OXYGEN SATURATION: 95 % | DIASTOLIC BLOOD PRESSURE: 89 MMHG | HEIGHT: 60 IN | BODY MASS INDEX: 27.48 KG/M2 | TEMPERATURE: 98.2 F | SYSTOLIC BLOOD PRESSURE: 134 MMHG

## 2024-12-09 PROCEDURE — 99233 SBSQ HOSP IP/OBS HIGH 50: CPT | Performed by: NURSE PRACTITIONER

## 2024-12-09 RX ORDER — LEVETIRACETAM 500 MG/1
500 TABLET ORAL EVERY 12 HOURS SCHEDULED
Status: DISCONTINUED | OUTPATIENT
Start: 2024-12-09 | End: 2024-12-09 | Stop reason: HOSPADM

## 2024-12-09 RX ORDER — LEVETIRACETAM 500 MG/1
500 TABLET ORAL 2 TIMES DAILY
Qty: 60 TABLET | Refills: 0 | Status: SHIPPED | OUTPATIENT
Start: 2024-12-09 | End: 2024-12-16 | Stop reason: SDUPTHER

## 2024-12-09 RX ORDER — LANOLIN ALCOHOL/MO/W.PET/CERES
1000 CREAM (GRAM) TOPICAL DAILY
Qty: 30 TABLET | Refills: 0 | Status: SHIPPED | OUTPATIENT
Start: 2024-12-09

## 2024-12-09 RX ADMIN — ROSUVASTATIN CALCIUM 5 MG: 5 TABLET, FILM COATED ORAL at 08:58

## 2024-12-09 RX ADMIN — LOSARTAN POTASSIUM 25 MG: 25 TABLET, FILM COATED ORAL at 08:58

## 2024-12-09 RX ADMIN — LEVETIRACETAM 500 MG: 500 TABLET, FILM COATED ORAL at 10:46

## 2024-12-09 RX ADMIN — Medication 1000 MG: at 08:57

## 2024-12-09 RX ADMIN — LEVOTHYROXINE SODIUM 25 MCG: 25 TABLET ORAL at 08:57

## 2024-12-09 RX ADMIN — VERAPAMIL HYDROCHLORIDE 240 MG: 240 TABLET, FILM COATED, EXTENDED RELEASE ORAL at 08:57

## 2024-12-09 RX ADMIN — ACETAMINOPHEN 325MG 650 MG: 325 TABLET ORAL at 09:06

## 2024-12-09 NOTE — PROGRESS NOTES
DOS: 2024  NAME: Aracelis Arechiga   : 1955  PCP: Fransico Lopez MD    Chief Complaint   Patient presents with    Memory Loss         Subjective: Pt seen in follow up, however the problem is new to me.  Patient sitting up in her recliner with her  at bedside.  States she has had no recurrent symptoms since being admitted and feels back at her baseline.      Objective:  Vital signs:   Vitals:    24 2341 24 0018 24 0325 24 0756   BP: 117/75  122/78 134/89   BP Location: Right arm  Right arm Right arm   Patient Position: Lying  Lying Sitting   Pulse: 71  78 86   Resp: 16  16 18   Temp: 98.1 °F (36.7 °C)  98.2 °F (36.8 °C)    TempSrc: Oral  Oral    SpO2: 97%  98% 95%   Weight:  63.5 kg (139 lb 15.9 oz)     Height:           Current Facility-Administered Medications:     acetaminophen (TYLENOL) tablet 650 mg, 650 mg, Oral, Q4H PRN, 650 mg at 24 0906 **OR** acetaminophen (TYLENOL) 160 MG/5ML oral solution 650 mg, 650 mg, Oral, Q4H PRN **OR** acetaminophen (TYLENOL) suppository 650 mg, 650 mg, Rectal, Q4H PRN, Zara Rincon MD    sennosides-docusate (PERICOLACE) 8.6-50 MG per tablet 2 tablet, 2 tablet, Oral, BID PRN **AND** polyethylene glycol (MIRALAX) packet 17 g, 17 g, Oral, Daily PRN **AND** bisacodyl (DULCOLAX) EC tablet 5 mg, 5 mg, Oral, Daily PRN **AND** bisacodyl (DULCOLAX) suppository 10 mg, 10 mg, Rectal, Daily PRN, Zara Rincon MD    calcium carbonate (oyster shell) tablet 1,000 mg, 1,000 mg, Oral, Daily, Zara Rincon MD, 1,000 mg at 24 0857    levETIRAcetam (KEPPRA) tablet 500 mg, 500 mg, Oral, Q12H, Jada Hernandez, APRN, 500 mg at 24 1046    levothyroxine (SYNTHROID, LEVOTHROID) tablet 25 mcg, 25 mcg, Oral, Daily, Zara Rincon MD, 25 mcg at 24 0857    losartan (COZAAR) tablet 25 mg, 25 mg, Oral, Q24H, Zara Rincon MD, 25 mg at 24 0858    melatonin tablet 2.5 mg, 2.5 mg, Oral, Nightly PRN,  Zara Rincon MD    ondansetron ODT (ZOFRAN-ODT) disintegrating tablet 4 mg, 4 mg, Oral, Q6H PRN **OR** ondansetron (ZOFRAN) injection 4 mg, 4 mg, Intravenous, Q6H PRN, Zara Rincon MD    rosuvastatin (CRESTOR) tablet 5 mg, 5 mg, Oral, Daily, Zara Rincon MD, 5 mg at 12/09/24 0858    sodium chloride 0.9 % flush 10 mL, 10 mL, Intravenous, PRN, Sue Oglesby MD    verapamil SR (CALAN-SR) CR tablet 240 mg, 240 mg, Oral, Q24H, Zara Rincon MD, 240 mg at 12/09/24 0857    Current Outpatient Medications:     Calcium Carbonate (CALCIUM 600) 1500 (600 Ca) MG tablet, Take 1 tablet by mouth Daily., Disp: , Rfl:     Coenzyme Q10 (COQ10) 200 MG capsule, Take  by mouth., Disp: , Rfl:     fluticasone (FLONASE) 50 MCG/ACT nasal spray, Administer 2 sprays into the nostril(s) as directed by provider Daily., Disp: , Rfl:     folic acid (FOLVITE) 400 MCG tablet, Take 1 tablet by mouth Daily., Disp: , Rfl:     irbesartan (AVAPRO) 75 MG tablet, , Disp: , Rfl:     levothyroxine (SYNTHROID, LEVOTHROID) 25 MCG tablet, TAKE ONE TABLET BY MOUTH DAILY, Disp: 90 tablet, Rfl: 0    Misc Natural Products (LUTEIN 20 PO), Take  by mouth., Disp: , Rfl:     Omega-3 Fatty Acids (FISH OIL) 1000 MG capsule capsule, Take  by mouth Daily With Breakfast., Disp: , Rfl:     rosuvastatin (CRESTOR) 5 MG tablet, Take 1 tablet by mouth Daily., Disp: , Rfl:     SUMAtriptan (IMITREX) 100 MG tablet, , Disp: , Rfl:     verapamil SR (CALAN-SR) 240 MG CR tablet, , Disp: , Rfl:     levETIRAcetam (Keppra) 500 MG tablet, Take 1 tablet by mouth 2 (Two) Times a Day., Disp: 60 tablet, Rfl: 0    propranolol (INDERAL) 10 MG tablet, Take 1 tablet by mouth 2 (Two) Times a Day., Disp: , Rfl:     vitamin B-12 (CYANOCOBALAMIN) 1000 MCG tablet, Take 1 tablet by mouth Daily., Disp: 30 tablet, Rfl: 0    PRN meds    acetaminophen **OR** acetaminophen **OR** acetaminophen    senna-docusate sodium **AND** polyethylene glycol **AND** bisacodyl  **AND** bisacodyl    melatonin    ondansetron ODT **OR** ondansetron    sodium chloride    No current facility-administered medications on file prior to encounter.     Current Outpatient Medications on File Prior to Encounter   Medication Sig    Calcium Carbonate (CALCIUM 600) 1500 (600 Ca) MG tablet Take 1 tablet by mouth Daily.    Coenzyme Q10 (COQ10) 200 MG capsule Take  by mouth.    fluticasone (FLONASE) 50 MCG/ACT nasal spray Administer 2 sprays into the nostril(s) as directed by provider Daily.    folic acid (FOLVITE) 400 MCG tablet Take 1 tablet by mouth Daily.    irbesartan (AVAPRO) 75 MG tablet     levothyroxine (SYNTHROID, LEVOTHROID) 25 MCG tablet TAKE ONE TABLET BY MOUTH DAILY    Misc Natural Products (LUTEIN 20 PO) Take  by mouth.    Omega-3 Fatty Acids (FISH OIL) 1000 MG capsule capsule Take  by mouth Daily With Breakfast.    rosuvastatin (CRESTOR) 5 MG tablet Take 1 tablet by mouth Daily.    SUMAtriptan (IMITREX) 100 MG tablet     verapamil SR (CALAN-SR) 240 MG CR tablet     propranolol (INDERAL) 10 MG tablet Take 1 tablet by mouth 2 (Two) Times a Day.       General appearance: Elderly female, NAD, alert and cooperative, well groomed  HEENT: Normocephalic, atraumatic    Neurological:   MS: oriented x3, normal attention/concentration, language intact, no neglect, normal fund of knowledge  CN: visual fields full, EOMI, facial sensation equal, no facial droop, shoulder shrug equal, tongue midline  Motor: 5/5 in all 4 ext.  Mild resting head tremor.  Subtle action tremor of the right hand.  Sensory: light touch sensation intact in all 4 ext.  Coordination: Normal finger to nose test  Gait and station: deferred   Rapid alternating movements: normal finger to thumb tap    Laboratory results:  Lab Results   Component Value Date    TSH 1.740 12/08/2024     Lab Results   Component Value Date    JCUMKOYT56 356 12/08/2024     Lab Results   Component Value Date    HGBA1C 5.20 08/31/2020     Lab Results  "  Component Value Date    GLUCOSE 87 12/08/2024    BUN 16 12/08/2024    CREATININE 0.74 12/08/2024    EGFRIFNONA 50 (L) 09/15/2021    EGFRIFAFRI 90 04/02/2021    BCR 21.6 12/08/2024    K 3.9 12/08/2024    CO2 26.0 12/08/2024    CALCIUM 9.1 12/08/2024    PROTENTOTREF 6.3 08/31/2020    ALBUMIN 4.4 12/07/2024    LABIL2 2.7 08/31/2020    AST 28 12/07/2024    ALT 23 12/07/2024     Lab Results   Component Value Date    WBC 8.31 12/08/2024    HGB 11.8 (L) 12/08/2024    HCT 34.8 12/08/2024    MCV 92.1 12/08/2024     12/08/2024     Brief Urine Lab Results  (Last result in the past 365 days)        Color   Clarity   Blood   Leuk Est   Nitrite   Protein   CREAT   Urine HCG        12/07/24 1502 Yellow   Clear   Moderate (2+)   Negative   Negative   100 mg/dL (2+)                 No results found for: \"ACANTHNAEG\", \"AFBCX\", \"BPERTUSSISCX\", \"BLOODCX\"  No results found for: \"BCIDPCR\", \"CXREFLEX\", \"CSFCX\", \"CULTURETIS\"  No results found for: \"CULTURES\", \"HSVCX\", \"URCX\"  No results found for: \"EYECULTURE\", \"GCCX\", \"HSVCULTURE\", \"LABHSV\"  No results found for: \"LEGIONELLA\", \"MRSACX\", \"MUMPSCX\", \"MYCOPLASCX\"  No results found for: \"NOCARDIACX\", \"STOOLCX\"  No results found for: \"THROATCX\", \"UNSTIMCULT\", \"URINECX\", \"CULTURE\", \"VZVCULTUR\"  No results found for: \"VIRALCULTU\", \"WOUNDCX\"  Pain Management Panel          Latest Ref Rng & Units 8/31/2020   Pain Management Panel   Creatinine, Urine Not Estab. mg/dL 78.1       Details                 Folate >20.00    Review and interpretation of imaging:  MRI Brain Without Contrast    Result Date: 12/8/2024  No acute findings  This report was finalized on 12/8/2024 2:14 PM by Dr. Ceasar Garcia M.D on Workstation: [id number]      CT CEREBRAL PERFUSION WITH & WITHOUT CONTRAST    Result Date: 12/7/2024   1. No acute intracranial process. 2. No core or ischemia on CT perfusion. 3. No large vessel occlusion, aneurysm or dissection seen.  Call Report: Dr. Oglesby was notified by telephone of " the above findings on December 7, 2024 at 3:34 p.m.  This report was finalized on 12/7/2024 3:35 PM by Dr. Ceasar Garcia M.D on Workstation: [id number]      CT Angiogram Neck    Result Date: 12/7/2024   1. No acute intracranial process. 2. No core or ischemia on CT perfusion. 3. No large vessel occlusion, aneurysm or dissection seen.  Call Report: Dr. Oglesby was notified by telephone of the above findings on December 7, 2024 at 3:34 p.m.  This report was finalized on 12/7/2024 3:35 PM by Dr. Ceasar Garcia M.D on Workstation: [id number]      CT Angiogram Head w AI Analysis of LVO    Result Date: 12/7/2024   1. No acute intracranial process. 2. No core or ischemia on CT perfusion. 3. No large vessel occlusion, aneurysm or dissection seen.  Call Report: Dr. Oglesby was notified by telephone of the above findings on December 7, 2024 at 3:34 p.m.  This report was finalized on 12/7/2024 3:35 PM by Dr. Ceasar Garcia M.D on Workstation: [id number]         Impression/Assessment:  This is a 69-year-old female with past medical history of migraine headaches, hypertension, hyperlipidemia, thyroid disease, tremor recently started on propranolol who presented to the hospital on 12/7/2024 with complaints of transient memory loss. Reportedly the patient left for [place] and started having issues with short-term memory while she was there.  She reports she was normal prior to leaving for [place].  Her  was not present prior to her leaving so he was unaware if symptoms had started before then.  She recently lost a family member and has been extremely worried about 2 other family members who are chronically ill and needing to be cared for.  Her  states that the patient could not remember that she had lost a family member a couple of months ago.  She did present to [facility] ER initially and per review of their note the patient was having trouble remembering her age as well.  Her BP on arrival  was 150/108.  She had a CTA/CTP obtained that did not reveal any obvious evidence of LVO, perfusion abnormality, or hemorrhage.  Noncontrasted MRI brain was obtained and did not appear to show any acute intracranial abnormalities, moderate white matter disease noted.    She had a similar episode 24 years ago where she states she lost a niece to suicide and a couple of days later had issues with remembering anything.  She states she did see a neurologist but she was not told that anything was found.  She denies any history of seizure.  No family history of seizure.  The symptoms lasted for most of the day.  She feels back at her baseline now.    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      Diagnosis:  Transient alteration of awareness  Abnormal EEG  Hypertensive urgency  Anxiety and depression    Plan:  - Her EEG is showing left frontotemporal slowing consistent with a structural lesion although her MRI looked okay, and in addition she had intermittent left frontotemporal delta activity along with a few sharp waves.  Discussed these findings with Dr. Bhandari, due to concern for underlying seizure disorder we will go ahead and start her on low-dose Keppra 500 mg twice daily and have her follow-up with one of the epileptologist in the outpatient setting hopefully in the next 6 to 8 weeks.  I did discuss these findings with the patient as well as her  in detail and answered all of their questions.  They are agreeable to starting the Keppra.  I discussed side effects extensively as well as seizure precautions including no driving for 90 days.  They will be traveling to [place] potentially next month for at least 3 months.  I discussed with  her that she will need to abide by [place] State laws if she is there for longer than the 3 months per [place] as far as seizure precautions.  - She needs to regularly monitor her BP, would recommend she check her BP if she has another episode like this.  - Recommend grief counseling for her recent increase stressor from loss.  Would also recommend behavioral therapist for anxiety and depression.  -  had questions about the propranolol potentially triggering this episode as she had taken about a day and a half worth prior to this incident.  Discussed that likely this was not the contributor but it can cause disorientation.  If her tremors are not affecting her ADLs significantly I would recommend holding off on restarting for now.  - I have sent a message to our  to get her seen in our office.  We will sign off and see again per request.    Case discussed with patient,  at bedside, Dr. Amado, and Dr. Bhandari, and he agrees with plan above.     EULALIA Herring

## 2024-12-09 NOTE — PLAN OF CARE
Problem: Adult Inpatient Plan of Care  Goal: Plan of Care Review  Outcome: Progressing  Flowsheets (Taken 12/9/2024 0225)  Plan of Care Reviewed With: patient  Goal: Patient-Specific Goal (Individualized)  Outcome: Progressing  Goal: Absence of Hospital-Acquired Illness or Injury  Outcome: Progressing  Intervention: Identify and Manage Fall Risk  Recent Flowsheet Documentation  Taken 12/9/2024 0200 by Lyly Guaman RN  Safety Promotion/Fall Prevention:   activity supervised   assistive device/personal items within reach   clutter free environment maintained   safety round/check completed  Taken 12/8/2024 2343 by Lyly Guaman RN  Safety Promotion/Fall Prevention:   activity supervised   assistive device/personal items within reach   clutter free environment maintained   safety round/check completed  Taken 12/8/2024 2200 by Lyly Guaman RN  Safety Promotion/Fall Prevention:   activity supervised   assistive device/personal items within reach   clutter free environment maintained   safety round/check completed  Taken 12/8/2024 2000 by Lyly Guaman RN  Safety Promotion/Fall Prevention: safety round/check completed  Intervention: Prevent Skin Injury  Recent Flowsheet Documentation  Taken 12/9/2024 0200 by Lyly Guaman RN  Body Position: turned  Taken 12/8/2024 2343 by Lyly Guaman RN  Body Position: turned  Taken 12/8/2024 2200 by Lyly Guaman RN  Body Position: turned  Taken 12/8/2024 2000 by Lyly Guaman RN  Body Position: position changed independently  Skin Protection: protective footwear used  Intervention: Prevent and Manage VTE (Venous Thromboembolism) Risk  Recent Flowsheet Documentation  Taken 12/8/2024 2000 by Lyly Guaman RN  VTE Prevention/Management: patient refused intervention  Intervention: Prevent Infection  Recent Flowsheet Documentation  Taken 12/8/2024 2000 by Lyly Guaman RN  Infection Prevention: single patient room provided  Goal: Optimal Comfort and  Wellbeing  Outcome: Progressing  Intervention: Provide Person-Centered Care  Recent Flowsheet Documentation  Taken 12/8/2024 2000 by Lyly Guaman RN  Trust Relationship/Rapport:   choices provided   care explained  Goal: Readiness for Transition of Care  Outcome: Progressing     Problem: Comorbidity Management  Goal: Blood Pressure in Desired Range  Outcome: Progressing  Intervention: Maintain Blood Pressure Management  Recent Flowsheet Documentation  Taken 12/9/2024 0200 by Lyly Guaman RN  Medication Review/Management: medications reviewed  Taken 12/8/2024 2343 by Lyly Guaman RN  Medication Review/Management: medications reviewed  Taken 12/8/2024 2200 by Lyly Guaman RN  Medication Review/Management: medications reviewed  Taken 12/8/2024 2000 by Lyly Guaman RN  Medication Review/Management: medications reviewed  Goal: Maintenance of Osteoarthritis Symptom Control  Outcome: Progressing  Intervention: Maintain Osteoarthritis Symptom Control  Recent Flowsheet Documentation  Taken 12/9/2024 0200 by Lyly Guaman RN  Medication Review/Management: medications reviewed  Taken 12/8/2024 2343 by Lyly Guaman RN  Medication Review/Management: medications reviewed  Taken 12/8/2024 2200 by Lyly Guaman RN  Medication Review/Management: medications reviewed  Taken 12/8/2024 2000 by Lyly Guaman RN  Medication Review/Management: medications reviewed     Problem: Stroke, Ischemic (Includes Transient Ischemic Attack)  Goal: Optimal Coping  Outcome: Progressing  Intervention: Support Psychosocial Response to Stroke  Recent Flowsheet Documentation  Taken 12/8/2024 2000 by Lyly Guaman RN  Supportive Measures: active listening utilized  Family/Support System Care: self-care encouraged  Goal: Effective Bowel Elimination  Outcome: Progressing  Goal: Optimal Cerebral Tissue Perfusion  Outcome: Progressing  Intervention: Protect and Optimize Cerebral Perfusion  Recent Flowsheet Documentation  Taken  12/8/2024 2000 by Lyly Guaman RN  Sensory Stimulation Regulation: care clustered  Cerebral Perfusion Promotion: blood pressure monitored  Goal: Optimal Cognitive Function  Outcome: Progressing  Intervention: Optimize Cognitive Function  Recent Flowsheet Documentation  Taken 12/8/2024 2000 by Lyly Guaman RN  Sensory Stimulation Regulation: care clustered  Reorientation Measures: clock in view  Environment Familiarity/Consistency: daily routine followed  Goal: Improved Communication Skills  Outcome: Progressing  Intervention: Optimize Communication Skills  Recent Flowsheet Documentation  Taken 12/8/2024 2000 by Lyly Guaman RN  Communication Enhancement Strategies: call light answered in person  Goal: Optimal Functional Ability  Outcome: Progressing  Goal: Optimal Nutrition Intake  Outcome: Progressing  Goal: Effective Oxygenation and Ventilation  Outcome: Progressing  Intervention: Optimize Oxygenation and Ventilation  Recent Flowsheet Documentation  Taken 12/9/2024 0200 by Lyly Guaman RN  Head of Bed (HOB) Positioning: HOB at 30 degrees  Taken 12/8/2024 2343 by Lyly Guaman RN  Head of Bed (HOB) Positioning: HOB at 30 degrees  Taken 12/8/2024 2200 by Lyly Guaman RN  Head of Bed (HOB) Positioning: HOB at 30 degrees  Taken 12/8/2024 2000 by Lyly Guaman RN  Head of Bed (HOB) Positioning: HOB at 15 degrees  Goal: Improved Sensorimotor Function  Outcome: Progressing  Intervention: Optimize Range of Motion, Motor Control and Function  Recent Flowsheet Documentation  Taken 12/9/2024 0200 by Lyly Guaman RN  Positioning/Transfer Devices:   pillows   in use  Taken 12/8/2024 2343 by Lyly Guaman RN  Positioning/Transfer Devices:   pillows   in use  Taken 12/8/2024 2200 by Lyly Guaman RN  Positioning/Transfer Devices:   pillows   in use  Taken 12/8/2024 2000 by Lyly Guaman RN  Spasticity Management: positioned with supportive device  Positioning/Transfer Devices:   pillows   in  use  Range of Motion: active ROM (range of motion) encouraged  Intervention: Optimize Sensory and Perceptual Ability  Recent Flowsheet Documentation  Taken 12/8/2024 2000 by Lyly Guaman RN  Pressure Reduction Techniques: frequent weight shift encouraged  Sensation Impairment Protection: cues provided for safety  Pressure Reduction Devices: positioning supports utilized  Goal: Safe and Effective Swallow  Outcome: Progressing  Goal: Effective Urinary Elimination  Outcome: Progressing   Goal Outcome Evaluation:  Plan of Care Reviewed With: patient

## 2024-12-09 NOTE — PLAN OF CARE
Problem: Adult Inpatient Plan of Care  Goal: Absence of Hospital-Acquired Illness or Injury  Intervention: Identify and Manage Fall Risk  Recent Flowsheet Documentation  Taken 12/9/2024 1002 by Cherry Phillips RN  Safety Promotion/Fall Prevention:   safety round/check completed   nonskid shoes/slippers when out of bed  Taken 12/9/2024 0805 by Cherry Phillips RN  Safety Promotion/Fall Prevention:   safety round/check completed   nonskid shoes/slippers when out of bed   mobility aid in reach   lighting adjusted   fall prevention program maintained   gait belt   clutter free environment maintained   assistive device/personal items within reach   activity supervised  Intervention: Prevent Skin Injury  Recent Flowsheet Documentation  Taken 12/9/2024 0805 by Cherry Phillips RN  Body Position: position changed independently  Skin Protection:   protective footwear used   transparent dressing maintained  Intervention: Prevent Infection  Recent Flowsheet Documentation  Taken 12/9/2024 1002 by Cherry Phillips RN  Infection Prevention: single patient room provided  Taken 12/9/2024 0805 by Cherry Phillips RN  Infection Prevention: single patient room provided  Goal: Optimal Comfort and Wellbeing  Intervention: Monitor Pain and Promote Comfort  Recent Flowsheet Documentation  Taken 12/9/2024 0805 by Cherry Phillips RN  Pain Management Interventions:   position adjusted   pillow support provided   pain medication given  Intervention: Provide Person-Centered Care  Recent Flowsheet Documentation  Taken 12/9/2024 0805 by Cherry Phillips RN  Trust Relationship/Rapport:   care explained   choices provided   empathic listening provided   questions answered   questions encouraged   reassurance provided   thoughts/feelings acknowledged     Problem: Comorbidity Management  Goal: Blood Pressure in Desired Range  Intervention: Maintain Blood Pressure Management  Recent Flowsheet Documentation  Taken 12/9/2024 0805 by Cherry Phillips RN  Medication  Review/Management: medications reviewed  Goal: Maintenance of Osteoarthritis Symptom Control  Intervention: Maintain Osteoarthritis Symptom Control  Recent Flowsheet Documentation  Taken 12/9/2024 0805 by Cherry Phillips RN  Activity Management: up in chair  Assistive Device Utilized: walker  Medication Review/Management: medications reviewed     Problem: Stroke, Ischemic (Includes Transient Ischemic Attack)  Goal: Optimal Coping  Intervention: Support Psychosocial Response to Stroke  Recent Flowsheet Documentation  Taken 12/9/2024 0805 by Cherry Phillips RN  Supportive Measures: active listening utilized  Family/Support System Care: self-care encouraged  Goal: Optimal Cerebral Tissue Perfusion  Intervention: Protect and Optimize Cerebral Perfusion  Recent Flowsheet Documentation  Taken 12/9/2024 0805 by Cherry Phillips RN  Sensory Stimulation Regulation:   care clustered   television on  Cerebral Perfusion Promotion: blood pressure monitored  Goal: Optimal Cognitive Function  Intervention: Optimize Cognitive Function  Recent Flowsheet Documentation  Taken 12/9/2024 0805 by Cherry Phillips RN  Sensory Stimulation Regulation:   care clustered   television on  Reorientation Measures: clock in view  Environment Familiarity/Consistency: daily routine followed  Goal: Improved Communication Skills  Intervention: Optimize Communication Skills  Recent Flowsheet Documentation  Taken 12/9/2024 0805 by Cherry Phillips RN  Communication Enhancement Strategies: call light answered in person  Goal: Optimal Functional Ability  Intervention: Optimize Functional Ability  Recent Flowsheet Documentation  Taken 12/9/2024 0805 by Cherry Phillips RN  Activity Management: up in chair  Goal: Effective Oxygenation and Ventilation  Intervention: Optimize Oxygenation and Ventilation  Recent Flowsheet Documentation  Taken 12/9/2024 0805 by Cherry Phillips RN  Head of Bed (HOB) Positioning: HOB elevated  Goal: Improved Sensorimotor Function  Intervention: Optimize  Range of Motion, Motor Control and Function  Recent Flowsheet Documentation  Taken 12/9/2024 0805 by Cherry Phillips, RN  Positioning/Transfer Devices:   pillows   in use  Range of Motion: active ROM (range of motion) encouraged  Intervention: Optimize Sensory and Perceptual Ability  Recent Flowsheet Documentation  Taken 12/9/2024 0805 by Cherry Phillips, RN  Pressure Reduction Techniques: frequent weight shift encouraged  Pressure Reduction Devices: positioning supports utilized   Goal Outcome Evaluation:   VSS this am, no acute changes noted, PERRLA, chronic back pain noted, PRN tylenol admin. Neuro met with pt and Keppra was started, education admin to pt and spouse at the bedside. Pt is ok to d/c per MD JASMINE Amado. Paperwork and med list reviwed c/ pt and spouse at this time. All personal items sent home c/ pt.

## 2024-12-09 NOTE — CASE MANAGEMENT/SOCIAL WORK
Case Management Discharge Note      Final Note: Home; self-care. Arabella OBANDO         Selected Continued Care - Discharged on 12/9/2024 Admission date: 12/7/2024 - Discharge disposition: Home or Self Care      Destination    No services have been selected for the patient.                Durable Medical Equipment    No services have been selected for the patient.                Dialysis/Infusion    No services have been selected for the patient.                Home Medical Care    No services have been selected for the patient.                Therapy    No services have been selected for the patient.                Community Resources    No services have been selected for the patient.                Community & DME    No services have been selected for the patient.                         Final Discharge Disposition Code: 01 - home or self-care

## 2024-12-09 NOTE — DISCHARGE SUMMARY
"Date of Discharge:  12/9/2024    PCP: Fransioc Lopez MD    Discharge Diagnosis:   Active Hospital Problems    Diagnosis  POA    **TGA (transient global amnesia) [G45.4]  Yes    HTN (hypertension) [I10]  Unknown    Hyperlipidemia [E78.5]  Unknown    Hypothyroidism [E03.9]  Unknown      Resolved Hospital Problems   No resolved problems to display.          Consults       Date and Time Order Name Status Description    12/7/2024  6:42 PM Inpatient Neurology Consult General Completed           Hospital Course  Patient is a 69 y.o. female with a history of hypertension, hyperlipidemia, migraine headaches presented with transient memory loss. At outside facility she had elevated blood pressure. CT angio were unremarkable and she also had a MRI brain that was unremarkable. Neurology felt transient memory loss was from transient global amnesia secondary to elevated blood pressure and stress. B12 level was low normal and she will be started on replacement. Neurology recommended blood pressure goal less than 140/90 long-term. They also felt if she had continued memory problems she could follow-up with neurology for neuropsych assessment. She had an EEG that showed \"abnormal EEG during wakefulness and stage II sleep showing left frontotemporal slowing consistent with a structural lesion.  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 twice daily until she follows up with them.     I discussed the patient's findings and my recommendations with patient and nursing staff and Dr. Marrero and Jada David neurology APRN.    Temp:  [98.1 °F (36.7 °C)-99.1 °F (37.3 °C)] 98.2 °F (36.8 °C)  Heart Rate:  [71-89] 86  Resp:  [16-18] 18  BP: (108-134)/(75-89) 134/89  Body mass index is 27.34 kg/m².    Physical Exam  Constitutional:     "   General: She is not in acute distress.     Appearance: She is not toxic-appearing.   HENT:      Head: Normocephalic and atraumatic.   Cardiovascular:      Rate and Rhythm: Normal rate and regular rhythm.   Pulmonary:      Effort: Pulmonary effort is normal. No respiratory distress.      Breath sounds: Normal breath sounds.   Abdominal:      General: Bowel sounds are normal.      Palpations: Abdomen is soft.      Tenderness: There is no abdominal tenderness.   Musculoskeletal:         General: No swelling.   Skin:     General: Skin is warm and dry.   Neurological:      General: No focal deficit present.      Mental Status: She is alert and oriented to person, place, and time.   Psychiatric:         Mood and Affect: Mood normal.         Behavior: Behavior normal.       Disposition: Home or Self Care       Discharge Medications        New Medications        Instructions Start Date   levETIRAcetam 500 MG tablet  Commonly known as: Keppra   500 mg, Oral, 2 Times Daily      vitamin B-12 1000 MCG tablet  Commonly known as: CYANOCOBALAMIN   1,000 mcg, Oral, Daily             Continue These Medications        Instructions Start Date   Calcium 600 1500 (600 Ca) MG tablet  Generic drug: Calcium Carbonate   600 mg, Daily      CoQ10 200 MG capsule   Take  by mouth.      fish oil 1000 MG capsule capsule   Daily With Breakfast      fluticasone 50 MCG/ACT nasal spray  Commonly known as: FLONASE   2 sprays, Daily      folic acid 400 MCG tablet  Commonly known as: FOLVITE   400 mcg, Daily      irbesartan 75 MG tablet  Commonly known as: AVAPRO   No dose, route, or frequency recorded.      levothyroxine 25 MCG tablet  Commonly known as: SYNTHROID, LEVOTHROID   TAKE ONE TABLET BY MOUTH DAILY      LUTEIN 20 PO   Take  by mouth.      propranolol 10 MG tablet  Commonly known as: INDERAL   10 mg, Oral, 2 Times Daily      rosuvastatin 5 MG tablet  Commonly known as: CRESTOR   5 mg, Daily      SUMAtriptan 100 MG tablet  Commonly known as:  IMITREX   No dose, route, or frequency recorded.      verapamil  MG CR tablet  Commonly known as: CALAN-SR   No dose, route, or frequency recorded.               Activity Instructions       Activity as Tolerated      No driving for 90 days including cleared by provider. Seizure precautions per neurology            Follow-up Information       Fransico Lopez MD .    Specialty: Internal Medicine  Contact information:  [address] [facility] [address] [facility] [address]  [facility] [id number] [phone]               Cosmo Bhandari MD Follow up.    Specialty: Neurology  Contact information:  [address] [facility] [address]  [facility] [id number] [phone]                          No future appointments.  Pending Labs       Order Current Status    Methylmalonic Acid, Serum In process           Flo Amado MD  [facility]  12/09/24    Discharge time spent greater than 30 minutes.

## 2024-12-09 NOTE — DISCHARGE INSTRUCTIONS
SEIZURE INSTRUCTIONS:     Recommended to observe all seizure precautions, including, but not limited to no driving until seizure free for more than 3 months- as per State driving regulation / law;   Avoid all high-risk activity,   Take showers instead of baths,   Avoid swimming without observation,   Avoid open heat sources,   Avoid working at heights and   Avoid engaging in all potentially hazardous activities.

## 2024-12-10 ENCOUNTER — READMISSION MANAGEMENT (OUTPATIENT)
Dept: CALL CENTER | Facility: HOSPITAL | Age: 69
End: 2024-12-10
Payer: MEDICARE

## 2024-12-10 ENCOUNTER — TELEPHONE (OUTPATIENT)
Dept: NEUROLOGY | Facility: CLINIC | Age: 69
End: 2024-12-10

## 2024-12-10 NOTE — OUTREACH NOTE
Prep Survey      Flowsheet Row Responses   [facility] facility patient discharged from? [facility]   Is LACE score < 7 ? No   Eligibility Readm Mgmt   Discharge diagnosis TGA (transient global amnesia)   Does the patient have one of the following disease processes/diagnoses(primary or secondary)? Other   Does the patient have Home health ordered? No   Is there a DME ordered? No   Medication alerts for this patient see avs   Prep survey completed? Yes            Netta MARTIN - Registered Nurse

## 2024-12-10 NOTE — TELEPHONE ENCOUNTER
Caller: Aracelis Arechiga    Relationship to patient: Self    Best call back number: [phone]      New or established patient?  [x] New  [] Established    Date of discharge: 12/09/24    Facility discharged from: [facility]    Diagnosis/Symptoms: TRANSIENT MEMORY LOSS    Length of stay (If applicable): 2 DAYS    Specialty Only: Did you see a [facility] provider?    [x] Yes  [] No  If so, who? DR. GILL    Additional Details: NO F/U TIMEFRAME GIVEN ON D/C OR CONSULT FROM DR. GILL, HOWEVER DR. GILL STATES IN HIS CONSULT : -If memory issues continue to be a problem can follow-up with general neurology as an outpatient for neuropsych assessment, PLEASE REVIEW HOSP F/U NEEDS.    NOTE:  PT STATES SHE WILL BE LEAVING OUT OF TOWN ON  12/31 AND WILL NOT RETURN UNTIL THE MIDDLE OF MARCH 2025.      PLEASE REVIEW AND ADVISE.

## 2024-12-12 ENCOUNTER — READMISSION MANAGEMENT (OUTPATIENT)
Dept: CALL CENTER | Facility: HOSPITAL | Age: 69
End: 2024-12-12
Payer: MEDICARE

## 2024-12-12 NOTE — OUTREACH NOTE
Medical Week 1 Survey      Flowsheet Row Responses   [facility] [place] patient discharged from? [place]   Does the patient have one of the following disease processes/diagnoses(primary or secondary)? Other   Week 1 attempt successful? Yes   Call start time 1024   Call end time 1032   Discharge diagnosis TGA (transient global amnesia)   Person spoke with today (if not patient) and relationship patient   Meds reviewed with patient/caregiver? Yes   Is the patient having any side effects they believe may be caused by any medication additions or changes? Yes   Side effects comments  Keppra is making her very sleepy   Does the patient have all medications ordered at discharge? Yes   Is the patient taking all medications as directed (includes completed medication regime)? Yes   Does the patient have a primary care provider?  Yes   Does the patient have an appointment with their PCP within 7 days of discharge? Yes   Has the patient kept scheduled appointments due by today? Yes   Psychosocial issues? No   Did the patient receive a copy of their discharge instructions? Yes   Nursing interventions Reviewed instructions with patient   What is the patient's perception of their health status since discharge? Improving   Is the patient/caregiver able to teach back signs and symptoms related to disease process for when to call PCP? Yes   Is the patient/caregiver able to teach back signs and symptoms related to disease process for when to call 911? Yes   Is the patient/caregiver able to teach back the hierarchy of who to call/visit for symptoms/problems? PCP, Specialist, Home health nurse, Urgent Care, ED, 911 Yes   If the patient is a current smoker, are they able to teach back resources for cessation? Not a smoker   Week 1 call completed? Yes   Graduated Yes   Would this patient benefit from a Referral to Amb Social Work? No   Is the patient interested in additional calls from an ambulatory ? No   Graduated/Revoked  comments Patient is doing better but Keppra is making her very tired. No needs at this time.   Call end time 1032            Oz ORTIZ - Registered Nurse

## 2024-12-13 LAB — METHYLMALONATE SERPL-SCNC: 168 NMOL/L (ref 0–378)

## 2024-12-16 RX ORDER — LEVETIRACETAM 500 MG/1
500 TABLET ORAL 2 TIMES DAILY
Qty: 60 TABLET | Refills: 2 | Status: SHIPPED | OUTPATIENT
Start: 2024-12-16

## 2025-01-02 ENCOUNTER — PATIENT ROUNDING (BHMG ONLY) (OUTPATIENT)
Dept: NEUROLOGY | Facility: CLINIC | Age: 70
End: 2025-01-02
Payer: MEDICARE

## 2025-01-02 ENCOUNTER — OFFICE VISIT (OUTPATIENT)
Dept: NEUROLOGY | Facility: CLINIC | Age: 70
End: 2025-01-02
Payer: MEDICARE

## 2025-01-02 VITALS
OXYGEN SATURATION: 97 % | WEIGHT: 151 LBS | BODY MASS INDEX: 29.64 KG/M2 | HEIGHT: 60 IN | HEART RATE: 62 BPM | DIASTOLIC BLOOD PRESSURE: 84 MMHG | SYSTOLIC BLOOD PRESSURE: 122 MMHG

## 2025-01-02 DIAGNOSIS — G40.109 FOCAL EPILEPSY: Primary | ICD-10-CM

## 2025-01-02 DIAGNOSIS — G45.4 TGA (TRANSIENT GLOBAL AMNESIA): ICD-10-CM

## 2025-01-02 RX ORDER — LEVETIRACETAM 500 MG/1
500 TABLET ORAL 2 TIMES DAILY
Qty: 180 TABLET | Refills: 0 | Status: SHIPPED | OUTPATIENT
Start: 2025-01-02

## 2025-01-02 NOTE — PROGRESS NOTES
"Chief Complaint  Seizures (Hospital follow up - Patient wants to know if she really had a sz)    Subjective          Aracelis Arechiga presents to [facility] NEUROLOGY for   HISTORY OF PRESENT ILLNESS:    Aracelis Arechiga is a 69 year old right handed woman who presents to neurology clinic for initial evaluation and hospital follow up for spells concerning for possible seizures/TGA and abnormal EEG concerning for focal epilepsy.  Of note she has diagnosis of hypertension, mixed hyperlipidemia and migraine headaches (has not had any migraines in several years) and presented to the hospital on 12/7/2024 for transient memory loss.  She had gone to [employer] [place] [employer] and she does not remember much of the drive there and she was in the parking lot and she went into [employer] and then experienced loss of memory and she even played the keyboard for them to sing and she does not remember and when she got home she does not remember the drive home.  When she returned home her  asked how did it go and she does not remember them singing and he started asking questions about his family members if they were living or had passed away.  She had the same symptoms 20 years ago following suicide of her niece and she thought it may have been related to trauma of these symptoms.  She had some similar traumatic experiences with her family months ago prior to this most recent spell.  She had a negative CTA of her head and neck and a brain MRI scan which I reviewed the images independently today that demonstrates chronic white matter changes and no acute intracranial abnormalities.  She had an EEG done in the hospital which was read as \"Abnormal EEG during wakefulness and stage II sleep showing left frontotemporal slowing consistent with a structural lesion.  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  She denies any side effects on the medicine other than feeling tired and not wanting to do as much activities but denies any suicidal ideation/homicidal ideation or being depressed.  She has not had any further seizure like activity or similar spells and is not currently driving.  She denies any family history of seizures.  No history of meningitis or encephalitis.  She does report a history of head trauma in the past.        Past Medical History:   Diagnosis Date    Arthritis     Disease of thyroid gland     Hyperlipidemia     Hypertension     Insomnia     Migraines         Family History   Problem Relation Age of Onset    Macular degeneration Mother     Heart failure Father     Stroke Father                 Social History     Socioeconomic History    Marital status:    Tobacco Use    Smoking status: Former     Current packs/day: 0.00     Types: Cigarettes     Quit date: 2000     Years since quittin.1    Smokeless tobacco: Never    Tobacco comments:     No smoker for 20 years    Vaping Use    Vaping status: Never Used   Substance and Sexual Activity    Alcohol use: No    Drug use: No    Sexual activity: Not Currently     Partners: Male     Birth control/protection: Post-menopausal        I have reviewed and confirmed the accuracy of the ROS as documented by the MA/LPN/RN Fede Joy MD   Review of Systems   Neurological:  Positive for tremors, seizures, headache and memory problem. Negative for dizziness, syncope, facial asymmetry, speech difficulty, weakness, light-headedness, numbness and confusion.   Psychiatric/Behavioral:  Negative for agitation, behavioral problems, decreased concentration, dysphoric mood, hallucinations, self-injury, sleep disturbance, suicidal ideas, negative for hyperactivity, depressed mood and stress. The patient is not nervous/anxious.         Objective   Vital  "Signs:   /84   Pulse 62   Ht 152.4 cm (60\")   Wt 68.5 kg (151 lb)   SpO2 97%   BMI 29.49 kg/m²       PHYSICAL EXAM:    General   Mental Status - Alert. General Appearance - Well developed, Well groomed, Oriented and Cooperative. Orientation - Oriented X3.       Head and Neck  Head - normocephalic, atraumatic with no lesions or palpable masses.  Neck    Global Assessment - supple.       Eye   Sclera/Conjunctiva - Bilateral - Normal.    ENMT  Mouth and Throat   Oral Cavity/Oropharynx: Oropharynx - the soft palate,uvula and tongue are normal in appearance.    Chest and Lung Exam   Chest - lung clear to auscultation bilaterally.    Cardiovascular   Cardiovascular examination reveals  - normal heart sounds, regular rate and rhythm.    Neurologic   Mental Status: Speech - Normal. Cognitive function - appropriate fund of knowledge. No impairment of attention, Impairment of concentration, impairment of long term memory or impairment of short term memory.  Cranial Nerves:   II Optic: Visual acuity - Left - Normal. Right - Normal. Visual fields - Normal (to confrontation).  III Oculomotor: Pupillary constriction - Left - Normal. Right - Normal.  VII Facial: - Normal Bilaterally.   IX Glossopharyngeal / X Vagus - Normal.  XI Accessory: Trapezius - Bilateral - Normal. Sternocleidomastoid - Bilateral - Normal.  XII Hypoglossal - Bilateral - Normal.  Eye Movements: - Normal Bilaterally.  Sensory:   Light Touch: Intact - Globally.  Motor:   Bulk and Contour: - Normal.  Tone: - Normal.  Tremor: Not present.  Strength: 5/5 normal muscle strength - All Muscles.   General Assessment of Reflexes: - deep tendon reflexes are normal. Coordination - No Impairment of finger-to-nose or Impairment of rapid alternating movements. Gait - Normal.       Result Review :                 Assessment and Plan    Problem List Items Addressed This Visit       TGA (transient global amnesia)    Focal epilepsy - Primary    Current Assessment & " "Plan     69 year old right handed woman who presents to neurology clinic for initial evaluation and hospital follow up for spells concerning for possible seizures/TGA and abnormal EEG concerning for focal epilepsy.  Of note she has diagnosis of hypertension, mixed hyperlipidemia and migraine headaches (has not had any migraines in several years) and presented to the hospital on 12/7/2024 for transient memory loss.  She had gone to [employer] for [employer] and she does not remember much of the drive there and she was in the parking lot and she went into [employer] and then experienced loss of memory and she even played the keyboard for them to sing and she does not remember and when she got home she does not remember the drive home.  When she returned home her  asked how did it go and she does not remember them singing and he started asking questions about his family members if they were living or had passed away.  She had the same symptoms 20 years ago following suicide of her niece and she thought it may have been related to trauma of these symptoms.  She had some similar traumatic experiences with her family months ago prior to this most recent spell.  She had a negative CTA of her head and neck and a brain MRI scan which I reviewed the images independently today that demonstrates chronic white matter changes and no acute intracranial abnormalities.  She had an EEG done in the hospital which was read as \"Abnormal EEG during wakefulness and stage II sleep showing left frontotemporal slowing consistent with a structural lesion.  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  She denies any side effects on the medicine other than feeling tired and not wanting to do as much activities but denies any " suicidal ideation/homicidal ideation or being depressed.  She has not had any further seizure like activity or similar spells and is not currently driving.  She denies any family history of seizures.  No history of meningitis or encephalitis.  She does report a history of head trauma in the past.  I spoke with her extensively with regards to the results of her testing.  I will continue the levetiracetam 500 mg BID and again informed her of the [place] state law of not driving for 90 days of seizure freedom.  I discussed seizure precautions at length and advised her to take her medication as prescribed without missing any doses as epileptic seizures can be potentially fatal.          Relevant Medications    levETIRAcetam (Keppra) 500 MG tablet       I spent 45 minutes caring for Aracelis on this date of service. This time includes time spent by me in the following activities:preparing for the visit, reviewing tests, obtaining and/or reviewing a separately obtained history, performing a medically appropriate examination and/or evaluation , counseling and educating the patient/family/caregiver, ordering medications, tests, or procedures, documenting information in the medical record, independently interpreting results and communicating that information with the patient/family/caregiver, and care coordination    Follow Up   Return in about 3 months (around 4/2/2025).  Patient was given instructions and counseling regarding her condition or for health maintenance advice. Please see specific information pulled into the AVS if appropriate.

## 2025-01-02 NOTE — PATIENT INSTRUCTIONS
In general, we recommend using good judgement when you are doing certain activities and to avoid those activities that if you were to have a seizure, you could harm yourself or others. In the state of [place], it is the law that you cannot drive within 90 days of a seizure. We also recommend not standing over open flames, not getting on high ladders or the roof, not swimming or taking baths by yourself (showers are ok) and not operating heavy machinery or power tools.  [facility]  Fede Joy MD  Neurology clinic  [phone]    With anti-seizure medications, you may initially notice side effects of fatigue, drowsiness, unsteadiness, and dizziness.  Other possible side effects include nausea, abdominal pain, headache, blurry or double vision, slurred speech and mood changes.  Generally, patients will noticed these symptoms when the medication is first started or with higher doses and will go away with time.    It is import to consistently take your medication every day.  Missing just one dose may put you at risk for a breakthrough seizure.  Consider using reminders on your phone or a pill box.    If you develop a rash, please call the neurology clinic immediately or notify another healthcare professional, as this may be potentially life-threatening.  If you are unable to reach a healthcare professional, go to the emergency room immediately for further evaluation.    If you develop thoughts of wanting to hurt yourself or others, please call the neurology clinic immediately to notify another healthcare professional.  If you are unable to reach a healthcare professional, go to the emergency room immediately for further evaluation.    It is the [place] state law that you cannot drive within 90 days of a seizure.    You should avoid certain activities that if you were to have a seizure, you could harm yourself or others. In general, it is recommended that you avoid operating heavy machinery or  power tools, swimming or taking baths by yourself (showers are ok), don't stand over open flames, don't get on high ladders or the roof.  I also recommend to avoid sleeping on your stomach.    For further information on epilepsy and resources available to patients and their families, please visit the [facility] at [email or web address] or call [phone].    **Check out the [facility]'s monthly Art Group Gathering.  [facility] [address].  Call Jayne Adams at [phone] or email her at [email or web address] for the dates of future gatherings.**      **If you have having memory problems, consider HOBSCOTCH (Home-Based Self-management and Cognitive Training Changes lives).  It is an 8 week self-management program for adults with epilepsy and memory problems.  The program is free at the [facility].  Contact Beatrice Jimenez at [phone] or [email or web address].**

## 2025-01-02 NOTE — ASSESSMENT & PLAN NOTE
"69 year old right handed woman who presents to neurology clinic for initial evaluation and hospital follow up for spells concerning for possible seizures/TGA and abnormal EEG concerning for focal epilepsy.  Of note she has diagnosis of hypertension, mixed hyperlipidemia and migraine headaches (has not had any migraines in several years) and presented to the hospital on 12/7/2024 for transient memory loss.  She had gone to [employer] for [employer] and she does not remember much of the drive there and she was in the parking lot and she went into [employer] and then experienced loss of memory and she even played the keyboard for them to sing and she does not remember and when she got home she does not remember the drive home.  When she returned home her  asked how did it go and she does not remember them singing and he started asking questions about his family members if they were living or had passed away.  She had the same symptoms 20 years ago following suicide of her niece and she thought it may have been related to trauma of these symptoms.  She had some similar traumatic experiences with her family months ago prior to this most recent spell.  She had a negative CTA of her head and neck and a brain MRI scan which I reviewed the images independently today that demonstrates chronic white matter changes and no acute intracranial abnormalities.  She had an EEG done in the hospital which was read as \"Abnormal EEG during wakefulness and stage II sleep showing left frontotemporal slowing consistent with a structural lesion.  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  She denies any side effects on the medicine other than feeling tired and not wanting to do as much activities but denies any suicidal " ideation/homicidal ideation or being depressed.  She has not had any further seizure like activity or similar spells and is not currently driving.  She denies any family history of seizures.  No history of meningitis or encephalitis.  She does report a history of head trauma in the past.  I spoke with her extensively with regards to the results of her testing.  I will continue the levetiracetam 500 mg BID and again informed her of the [place] state law of not driving for 90 days of seizure freedom.  I discussed seizure precautions at length and advised her to take her medication as prescribed without missing any doses as epileptic seizures can be potentially fatal.

## 2025-04-03 ENCOUNTER — OFFICE VISIT (OUTPATIENT)
Dept: NEUROLOGY | Facility: CLINIC | Age: 70
End: 2025-04-03
Payer: MEDICARE

## 2025-04-03 VITALS
DIASTOLIC BLOOD PRESSURE: 76 MMHG | OXYGEN SATURATION: 97 % | BODY MASS INDEX: 30.04 KG/M2 | HEART RATE: 65 BPM | HEIGHT: 60 IN | WEIGHT: 153 LBS | SYSTOLIC BLOOD PRESSURE: 118 MMHG

## 2025-04-03 DIAGNOSIS — G40.109 FOCAL EPILEPSY: Primary | ICD-10-CM

## 2025-04-03 PROCEDURE — 99214 OFFICE O/P EST MOD 30 MIN: CPT | Performed by: PSYCHIATRY & NEUROLOGY

## 2025-04-03 PROCEDURE — 3074F SYST BP LT 130 MM HG: CPT | Performed by: PSYCHIATRY & NEUROLOGY

## 2025-04-03 PROCEDURE — 1160F RVW MEDS BY RX/DR IN RCRD: CPT | Performed by: PSYCHIATRY & NEUROLOGY

## 2025-04-03 PROCEDURE — 1159F MED LIST DOCD IN RCRD: CPT | Performed by: PSYCHIATRY & NEUROLOGY

## 2025-04-03 PROCEDURE — 3078F DIAST BP <80 MM HG: CPT | Performed by: PSYCHIATRY & NEUROLOGY

## 2025-04-03 RX ORDER — LEVETIRACETAM 500 MG/1
500 TABLET ORAL 2 TIMES DAILY
Qty: 180 TABLET | Refills: 0 | Status: SHIPPED | OUTPATIENT
Start: 2025-04-03

## 2025-04-03 NOTE — ASSESSMENT & PLAN NOTE
"69 year old right handed woman with spells concerning for possible seizures/TGA and abnormal EEG concerning for focal epilepsy.  Of note she has diagnosis of hypertension, mixed hyperlipidemia and migraine headaches (has not had any migraines in several years) and presented to the hospital on 12/7/2024 for transient memory loss.  She had gone to [employer] for [employer] and she does not remember much of the drive there and she was in the parking lot and she went into [employer] and then experienced loss of memory and she even played the keyboard for them to sing and she does not remember and when she got home she does not remember the drive home.  When she returned home her  asked how did it go and she does not remember them singing and he started asking questions about his family members if they were living or had passed away.  She had the same symptoms 20 years ago following suicide of her niece and she thought it may have been related to trauma of these symptoms.  She had some similar traumatic experiences with her family months ago prior to this most recent spell.  She had a negative CTA of her head and neck and a brain MRI scan which demonstrates chronic white matter changes and no acute intracranial abnormalities.  She had an EEG done in the hospital which was read as \"Abnormal EEG during wakefulness and stage II sleep showing left frontotemporal slowing consistent with a structural lesion.  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  She denies any side effects on the medicine other than feeling tired and not wanting to do as much activities but reports this has improved and may have been related to back issues at that time but denies any suicidal ideation/homicidal ideation or being depressed. "  She has not had any further seizure like activity or similar spells and is not currently driving but is looking forward to returning to driving as she has remained seizure free for 90 days.  She denies any family history of seizures.  No history of meningitis or encephalitis.  She does report a history of head trauma in the past.  Overall she is doing well.  I will continue current dose of levetiracetam and advised her to continue taking this medication without missing any doses as epileptic seizures can be potentially fatal.  Discussed seizure precautions and provided patient education information today.

## 2025-04-03 NOTE — PROGRESS NOTES
"Chief Complaint  Seizures (No new sz)    Subjective          Aracelis Arechiga presents to [facility] NEUROLOGY for   HISTORY OF PRESENT ILLNESS:    Aracelis Arechiga is a 69 year old right handed woman who returns to neurology clinic for follow up evaluation and hospital follow up for spells concerning for possible seizures/TGA and abnormal EEG concerning for focal epilepsy.  Of note she has diagnosis of hypertension, mixed hyperlipidemia and migraine headaches (has not had any migraines in several years) and presented to the hospital on 12/7/2024 for transient memory loss.  She had gone to [employer] for [employer] and she does not remember much of the drive there and she was in the parking lot and she went into [employer] and then experienced loss of memory and she even played the keyboard for them to sing and she does not remember and when she got home she does not remember the drive home.  When she returned home her  asked how did it go and she does not remember them singing and he started asking questions about his family members if they were living or had passed away.  She had the same symptoms 20 years ago following suicide of her niece and she thought it may have been related to trauma of these symptoms.  She had some similar traumatic experiences with her family months ago prior to this most recent spell.  She had a negative CTA of her head and neck and a brain MRI scan which demonstrates chronic white matter changes and no acute intracranial abnormalities.  She had an EEG done in the hospital which was read as \"Abnormal EEG during wakefulness and stage II sleep showing left frontotemporal slowing consistent with a structural lesion.  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  She " denies any side effects on the medicine other than feeling tired and not wanting to do as much activities but reports this has improved and may have been related to back issues at that time but denies any suicidal ideation/homicidal ideation or being depressed.  She has not had any further seizure like activity or similar spells and is not currently driving but is looking forward to returning to driving as she has remained seizure free for 90 days.  She denies any family history of seizures.  No history of meningitis or encephalitis.  She does report a history of head trauma in the past.  Overall she is doing well.      Past Medical History:   Diagnosis Date    Arthritis     Disease of thyroid gland     Hyperlipidemia     Hypertension     Insomnia     Migraines         Family History   Problem Relation Age of Onset    Macular degeneration Mother     Heart failure Father     Stroke Father             Cancer Father         Skin cancers    Hypertension Father     Kidney disease Father     Cancer Maternal Grandmother         Ovarian cancer metastasized to colon    Cancer Brother         Prostate cancer        Social History     Socioeconomic History    Marital status:    Tobacco Use    Smoking status: Former     Current packs/day: 0.00     Types: Cigarettes     Quit date: 2000     Years since quittin.4    Smokeless tobacco: Never    Tobacco comments:     No smoker for 20 years    Vaping Use    Vaping status: Never Used   Substance and Sexual Activity    Alcohol use: No    Drug use: No    Sexual activity: Not Currently     Partners: Male     Birth control/protection: Post-menopausal        I have reviewed and confirmed the accuracy of the ROS as documented by the MA/LPN/RN Fede Joy MD   Review of Systems   Neurological:  Positive for seizures. Negative for dizziness, tremors, syncope, facial asymmetry, speech difficulty, weakness, light-headedness, numbness, headache, memory problem and  "confusion.   Psychiatric/Behavioral:  Negative for agitation, behavioral problems, decreased concentration, dysphoric mood, hallucinations, self-injury, sleep disturbance, suicidal ideas, negative for hyperactivity, depressed mood and stress. The patient is not nervous/anxious.         Objective   Vital Signs:   /76   Pulse 65   Ht 152.4 cm (60\")   Wt 69.4 kg (153 lb)   SpO2 97%   BMI 29.88 kg/m²       PHYSICAL EXAM:    General   Mental Status - Alert. General Appearance - Well developed, Well groomed, Oriented and Cooperative. Orientation - Oriented X3.       Head and Neck  Head - normocephalic, atraumatic with no lesions or palpable masses.  Neck    Global Assessment - supple.       Eye   Sclera/Conjunctiva - Bilateral - Normal.    ENMT  Mouth and Throat   Oral Cavity/Oropharynx: Oropharynx - the soft palate,uvula and tongue are normal in appearance.    Chest and Lung Exam   Chest - lung clear to auscultation bilaterally.    Cardiovascular   Cardiovascular examination reveals  - normal heart sounds, regular rate and rhythm.    Neurologic   Mental Status: Speech - Normal. Cognitive function - appropriate fund of knowledge. No impairment of attention, Impairment of concentration, impairment of long term memory or impairment of short term memory.  Cranial Nerves:   II Optic: Visual acuity - Left - Normal. Right - Normal. Visual fields - Normal (to confrontation).  III Oculomotor: Pupillary constriction - Left - Normal. Right - Normal.  VII Facial: - Normal Bilaterally.   IX Glossopharyngeal / X Vagus - Normal.  XI Accessory: Trapezius - Bilateral - Normal. Sternocleidomastoid - Bilateral - Normal.  XII Hypoglossal - Bilateral - Normal.  Eye Movements: - Normal Bilaterally.  Sensory:   Light Touch: Intact - Globally.  Motor:   Bulk and Contour: - Normal.  Tone: - Normal.  Tremor: Not present.  Strength: 5/5 normal muscle strength - All Muscles.   General Assessment of Reflexes: - deep tendon reflexes are " "normal. Coordination - No Impairment of finger-to-nose or Impairment of rapid alternating movements. Gait - Normal.         Result Review :                 Assessment and Plan    Problem List Items Addressed This Visit       Focal epilepsy - Primary    Current Assessment & Plan   69 year old right handed woman with spells concerning for possible seizures/TGA and abnormal EEG concerning for focal epilepsy.  Of note she has diagnosis of hypertension, mixed hyperlipidemia and migraine headaches (has not had any migraines in several years) and presented to the hospital on 12/7/2024 for transient memory loss.  She had gone to [employer] for [employer] and she does not remember much of the drive there and she was in the parking lot and she went into [employer] and then experienced loss of memory and she even played the keyboard for them to sing and she does not remember and when she got home she does not remember the drive home.  When she returned home her  asked how did it go and she does not remember them singing and he started asking questions about his family members if they were living or had passed away.  She had the same symptoms 20 years ago following suicide of her niece and she thought it may have been related to trauma of these symptoms.  She had some similar traumatic experiences with her family months ago prior to this most recent spell.  She had a negative CTA of her head and neck and a brain MRI scan which demonstrates chronic white matter changes and no acute intracranial abnormalities.  She had an EEG done in the hospital which was read as \"Abnormal EEG during wakefulness and stage II sleep showing left frontotemporal slowing consistent with a structural lesion.  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  She denies any side effects on the medicine other than feeling tired and not wanting to do as much activities but reports this has improved and may have been related to back issues at that time but denies any suicidal ideation/homicidal ideation or being depressed.  She has not had any further seizure like activity or similar spells and is not currently driving but is looking forward to returning to driving as she has remained seizure free for 90 days.  She denies any family history of seizures.  No history of meningitis or encephalitis.  She does report a history of head trauma in the past.  Overall she is doing well.  I will continue current dose of levetiracetam and advised her to continue taking this medication without missing any doses as epileptic seizures can be potentially fatal.  Discussed seizure precautions and provided patient education information today.           Relevant Medications    levETIRAcetam (Keppra) 500 MG tablet         Follow Up   Return in about 3 months (around 7/3/2025).  Patient was given instructions and counseling regarding her condition or for health maintenance advice. Please see specific information pulled into the AVS if appropriate.

## 2025-04-03 NOTE — PATIENT INSTRUCTIONS
In general, we recommend using good judgement when you are doing certain activities and to avoid those activities that if you were to have a seizure, you could harm yourself or others. In the state of [place], it is the law that you cannot drive within 90 days of a seizure. We also recommend not standing over open flames, not getting on high ladders or the roof, not swimming or taking baths by yourself (showers are ok) and not operating heavy machinery or power tools.  [facility]  Fede Joy MD  Neurology clinic  [phone]    With anti-seizure medications, you may initially notice side effects of fatigue, drowsiness, unsteadiness, and dizziness.  Other possible side effects include nausea, abdominal pain, headache, blurry or double vision, slurred speech and mood changes.  Generally, patients will noticed these symptoms when the medication is first started or with higher doses and will go away with time.    It is import to consistently take your medication every day.  Missing just one dose may put you at risk for a breakthrough seizure.  Consider using reminders on your phone or a pill box.    If you develop a rash, please call the neurology clinic immediately or notify another healthcare professional, as this may be potentially life-threatening.  If you are unable to reach a healthcare professional, go to the emergency room immediately for further evaluation.    If you develop thoughts of wanting to hurt yourself or others, please call the neurology clinic immediately to notify another healthcare professional.  If you are unable to reach a healthcare professional, go to the emergency room immediately for further evaluation.    It is the [place] state law that you cannot drive within 90 days of a seizure.    You should avoid certain activities that if you were to have a seizure, you could harm yourself or others. In general, it is recommended that you avoid operating heavy machinery or  power tools, swimming or taking baths by yourself (showers are ok), don't stand over open flames, don't get on high ladders or the roof.  I also recommend to avoid sleeping on your stomach.    For further information on epilepsy and resources available to patients and their families, please visit the [facility] at [email or web address] or call [phone].    **Check out the [facility]'s monthly Art Group Gathering.  They [facility] [address].  Call Jayne Adams at [phone] or email her at [email or web address] for the dates of future gatherings.**      **If you have having memory problems, consider HOBSCOTCH (Home-Based Self-management and Cognitive Training Changes lives).  It is an 8 week self-management program for adults with epilepsy and memory problems.  The program is free at the [facility].  Contact Beatrice Jimenez at [phone] or [email or web address].**

## 2025-04-21 ENCOUNTER — LAB (OUTPATIENT)
Dept: LAB | Facility: HOSPITAL | Age: 70
End: 2025-04-21
Payer: MEDICARE

## 2025-04-21 ENCOUNTER — TRANSCRIBE ORDERS (OUTPATIENT)
Dept: ADMINISTRATIVE | Facility: HOSPITAL | Age: 70
End: 2025-04-21
Payer: MEDICARE

## 2025-04-21 DIAGNOSIS — E03.9 ACQUIRED HYPOTHYROIDISM: ICD-10-CM

## 2025-04-21 DIAGNOSIS — I10 ESSENTIAL HYPERTENSION, BENIGN: ICD-10-CM

## 2025-04-21 DIAGNOSIS — E78.5 HYPERLIPIDEMIA, UNSPECIFIED HYPERLIPIDEMIA TYPE: ICD-10-CM

## 2025-04-21 DIAGNOSIS — Z00.00 ROUTINE GENERAL MEDICAL EXAMINATION AT A HEALTH CARE FACILITY: Primary | ICD-10-CM

## 2025-04-21 DIAGNOSIS — Z00.00 ROUTINE GENERAL MEDICAL EXAMINATION AT A HEALTH CARE FACILITY: ICD-10-CM

## 2025-04-21 DIAGNOSIS — E53.9 VITAMIN B DEFICIENCY: ICD-10-CM

## 2025-04-21 LAB
ALBUMIN SERPL-MCNC: 3.7 G/DL (ref 3.5–5.2)
ALBUMIN/GLOB SERPL: 1.5 G/DL
ALP SERPL-CCNC: 72 U/L (ref 39–117)
ALT SERPL W P-5'-P-CCNC: 24 U/L (ref 1–33)
ANION GAP SERPL CALCULATED.3IONS-SCNC: 8.3 MMOL/L (ref 5–15)
AST SERPL-CCNC: 26 U/L (ref 1–32)
BACTERIA UR QL AUTO: NORMAL /HPF
BASOPHILS # BLD AUTO: 0.05 10*3/MM3 (ref 0–0.2)
BASOPHILS NFR BLD AUTO: 0.7 % (ref 0–1.5)
BILIRUB SERPL-MCNC: 0.4 MG/DL (ref 0–1.2)
BILIRUB UR QL STRIP: NEGATIVE
BUN SERPL-MCNC: 16 MG/DL (ref 8–23)
BUN/CREAT SERPL: 19.3 (ref 7–25)
CALCIUM SPEC-SCNC: 9.4 MG/DL (ref 8.6–10.5)
CHLORIDE SERPL-SCNC: 103 MMOL/L (ref 98–107)
CHOLEST SERPL-MCNC: 148 MG/DL (ref 0–200)
CLARITY UR: CLEAR
CO2 SERPL-SCNC: 28.7 MMOL/L (ref 22–29)
COLOR UR: YELLOW
CREAT SERPL-MCNC: 0.83 MG/DL (ref 0.57–1)
DEPRECATED RDW RBC AUTO: 39.7 FL (ref 37–54)
EGFRCR SERPLBLD CKD-EPI 2021: 75.5 ML/MIN/1.73
EOSINOPHIL # BLD AUTO: 0.2 10*3/MM3 (ref 0–0.4)
EOSINOPHIL NFR BLD AUTO: 2.7 % (ref 0.3–6.2)
ERYTHROCYTE [DISTWIDTH] IN BLOOD BY AUTOMATED COUNT: 11.7 % (ref 12.3–15.4)
GLOBULIN UR ELPH-MCNC: 2.5 GM/DL
GLUCOSE SERPL-MCNC: 87 MG/DL (ref 65–99)
GLUCOSE UR STRIP-MCNC: NEGATIVE MG/DL
HCT VFR BLD AUTO: 33.5 % (ref 34–46.6)
HDLC SERPL-MCNC: 77 MG/DL (ref 40–60)
HGB BLD-MCNC: 11.2 G/DL (ref 12–15.9)
HGB UR QL STRIP.AUTO: NEGATIVE
HYALINE CASTS UR QL AUTO: NORMAL /LPF
IMM GRANULOCYTES # BLD AUTO: 0.02 10*3/MM3 (ref 0–0.05)
IMM GRANULOCYTES NFR BLD AUTO: 0.3 % (ref 0–0.5)
KETONES UR QL STRIP: NEGATIVE
LDLC SERPL CALC-MCNC: 61 MG/DL (ref 0–100)
LDLC/HDLC SERPL: 0.81 {RATIO}
LEUKOCYTE ESTERASE UR QL STRIP.AUTO: ABNORMAL
LYMPHOCYTES # BLD AUTO: 2.4 10*3/MM3 (ref 0.7–3.1)
LYMPHOCYTES NFR BLD AUTO: 33 % (ref 19.6–45.3)
MCH RBC QN AUTO: 31.1 PG (ref 26.6–33)
MCHC RBC AUTO-ENTMCNC: 33.4 G/DL (ref 31.5–35.7)
MCV RBC AUTO: 93.1 FL (ref 79–97)
MONOCYTES # BLD AUTO: 0.76 10*3/MM3 (ref 0.1–0.9)
MONOCYTES NFR BLD AUTO: 10.4 % (ref 5–12)
NEUTROPHILS NFR BLD AUTO: 3.85 10*3/MM3 (ref 1.7–7)
NEUTROPHILS NFR BLD AUTO: 52.9 % (ref 42.7–76)
NITRITE UR QL STRIP: NEGATIVE
NRBC BLD AUTO-RTO: 0 /100 WBC (ref 0–0.2)
PH UR STRIP.AUTO: 7.5 [PH] (ref 5–8)
PLATELET # BLD AUTO: 291 10*3/MM3 (ref 140–450)
PMV BLD AUTO: 9.2 FL (ref 6–12)
POTASSIUM SERPL-SCNC: 4.7 MMOL/L (ref 3.5–5.2)
PROT SERPL-MCNC: 6.2 G/DL (ref 6–8.5)
PROT UR QL STRIP: NEGATIVE
RBC # BLD AUTO: 3.6 10*6/MM3 (ref 3.77–5.28)
RBC # UR STRIP: NORMAL /HPF
REF LAB TEST METHOD: NORMAL
SODIUM SERPL-SCNC: 140 MMOL/L (ref 136–145)
SP GR UR STRIP: 1.02 (ref 1–1.03)
SQUAMOUS #/AREA URNS HPF: NORMAL /HPF
TRIGL SERPL-MCNC: 43 MG/DL (ref 0–150)
TSH SERPL DL<=0.05 MIU/L-ACNC: 1.55 UIU/ML (ref 0.27–4.2)
UROBILINOGEN UR QL STRIP: ABNORMAL
VIT B12 BLD-MCNC: 1484 PG/ML (ref 211–946)
VLDLC SERPL-MCNC: 10 MG/DL (ref 5–40)
WBC # UR STRIP: NORMAL /HPF
WBC NRBC COR # BLD AUTO: 7.28 10*3/MM3 (ref 3.4–10.8)

## 2025-04-21 PROCEDURE — 82607 VITAMIN B-12: CPT

## 2025-04-21 PROCEDURE — 80053 COMPREHEN METABOLIC PANEL: CPT

## 2025-04-21 PROCEDURE — 81001 URINALYSIS AUTO W/SCOPE: CPT

## 2025-04-21 PROCEDURE — 85025 COMPLETE CBC W/AUTO DIFF WBC: CPT

## 2025-04-21 PROCEDURE — 36415 COLL VENOUS BLD VENIPUNCTURE: CPT

## 2025-04-21 PROCEDURE — 80061 LIPID PANEL: CPT

## 2025-04-21 PROCEDURE — 84443 ASSAY THYROID STIM HORMONE: CPT

## 2025-06-03 ENCOUNTER — TRANSCRIBE ORDERS (OUTPATIENT)
Dept: ADMINISTRATIVE | Facility: HOSPITAL | Age: 70
End: 2025-06-03
Payer: MEDICARE

## 2025-06-03 ENCOUNTER — LAB (OUTPATIENT)
Dept: LAB | Facility: HOSPITAL | Age: 70
End: 2025-06-03
Payer: MEDICARE

## 2025-06-03 DIAGNOSIS — D64.9 ANEMIA, UNSPECIFIED TYPE: ICD-10-CM

## 2025-06-03 DIAGNOSIS — D64.9 ANEMIA, UNSPECIFIED TYPE: Primary | ICD-10-CM

## 2025-06-03 LAB
BASOPHILS # BLD AUTO: 0.04 10*3/MM3 (ref 0–0.2)
BASOPHILS NFR BLD AUTO: 0.7 % (ref 0–1.5)
DEPRECATED RDW RBC AUTO: 39.9 FL (ref 37–54)
EOSINOPHIL # BLD AUTO: 0.14 10*3/MM3 (ref 0–0.4)
EOSINOPHIL NFR BLD AUTO: 2.3 % (ref 0.3–6.2)
ERYTHROCYTE [DISTWIDTH] IN BLOOD BY AUTOMATED COUNT: 11.5 % (ref 12.3–15.4)
FERRITIN SERPL-MCNC: 267 NG/ML (ref 13–150)
FOLATE SERPL-MCNC: >20 NG/ML (ref 4.78–24.2)
HCT VFR BLD AUTO: 35.3 % (ref 34–46.6)
HGB BLD-MCNC: 11.5 G/DL (ref 12–15.9)
IMM GRANULOCYTES # BLD AUTO: 0.02 10*3/MM3 (ref 0–0.05)
IMM GRANULOCYTES NFR BLD AUTO: 0.3 % (ref 0–0.5)
IRON 24H UR-MRATE: 91 MCG/DL (ref 37–145)
IRON SATN MFR SERPL: 32 % (ref 20–50)
LYMPHOCYTES # BLD AUTO: 2.25 10*3/MM3 (ref 0.7–3.1)
LYMPHOCYTES NFR BLD AUTO: 36.8 % (ref 19.6–45.3)
MCH RBC QN AUTO: 31 PG (ref 26.6–33)
MCHC RBC AUTO-ENTMCNC: 32.6 G/DL (ref 31.5–35.7)
MCV RBC AUTO: 95.1 FL (ref 79–97)
MONOCYTES # BLD AUTO: 0.59 10*3/MM3 (ref 0.1–0.9)
MONOCYTES NFR BLD AUTO: 9.6 % (ref 5–12)
NEUTROPHILS NFR BLD AUTO: 3.08 10*3/MM3 (ref 1.7–7)
NEUTROPHILS NFR BLD AUTO: 50.3 % (ref 42.7–76)
NRBC BLD AUTO-RTO: 0 /100 WBC (ref 0–0.2)
PLATELET # BLD AUTO: 267 10*3/MM3 (ref 140–450)
PMV BLD AUTO: 9.3 FL (ref 6–12)
RBC # BLD AUTO: 3.71 10*6/MM3 (ref 3.77–5.28)
RETICS # AUTO: 0.04 10*6/MM3 (ref 0.02–0.13)
RETICS/RBC NFR AUTO: 1.16 % (ref 0.7–1.9)
TIBC SERPL-MCNC: 285 MCG/DL (ref 298–536)
TRANSFERRIN SERPL-MCNC: 191 MG/DL (ref 200–360)
VIT B12 BLD-MCNC: 699 PG/ML (ref 211–946)
WBC NRBC COR # BLD AUTO: 6.12 10*3/MM3 (ref 3.4–10.8)

## 2025-06-03 PROCEDURE — 83540 ASSAY OF IRON: CPT

## 2025-06-03 PROCEDURE — 82746 ASSAY OF FOLIC ACID SERUM: CPT

## 2025-06-03 PROCEDURE — 85025 COMPLETE CBC W/AUTO DIFF WBC: CPT

## 2025-06-03 PROCEDURE — 82728 ASSAY OF FERRITIN: CPT

## 2025-06-03 PROCEDURE — 82607 VITAMIN B-12: CPT

## 2025-06-03 PROCEDURE — 85045 AUTOMATED RETICULOCYTE COUNT: CPT

## 2025-06-03 PROCEDURE — 36415 COLL VENOUS BLD VENIPUNCTURE: CPT

## 2025-06-03 PROCEDURE — 84466 ASSAY OF TRANSFERRIN: CPT

## 2025-07-03 ENCOUNTER — APPOINTMENT (OUTPATIENT)
Dept: WOMENS IMAGING | Facility: HOSPITAL | Age: 70
End: 2025-07-03
Payer: MEDICARE

## 2025-07-03 PROCEDURE — 77065 DX MAMMO INCL CAD UNI: CPT | Performed by: RADIOLOGY

## 2025-07-03 PROCEDURE — 76642 ULTRASOUND BREAST LIMITED: CPT | Performed by: RADIOLOGY

## 2025-07-03 PROCEDURE — G0279 TOMOSYNTHESIS, MAMMO: HCPCS | Performed by: RADIOLOGY

## 2025-07-07 ENCOUNTER — OFFICE VISIT (OUTPATIENT)
Dept: NEUROLOGY | Facility: CLINIC | Age: 70
End: 2025-07-07
Payer: MEDICARE

## 2025-07-07 VITALS
HEART RATE: 55 BPM | SYSTOLIC BLOOD PRESSURE: 150 MMHG | WEIGHT: 150 LBS | DIASTOLIC BLOOD PRESSURE: 82 MMHG | BODY MASS INDEX: 29.45 KG/M2 | OXYGEN SATURATION: 96 % | HEIGHT: 60 IN

## 2025-07-07 DIAGNOSIS — G40.109 FOCAL EPILEPSY: ICD-10-CM

## 2025-07-07 DIAGNOSIS — G45.4 TGA (TRANSIENT GLOBAL AMNESIA): Primary | ICD-10-CM

## 2025-07-07 PROCEDURE — 99214 OFFICE O/P EST MOD 30 MIN: CPT | Performed by: PSYCHIATRY & NEUROLOGY

## 2025-07-07 PROCEDURE — 1160F RVW MEDS BY RX/DR IN RCRD: CPT | Performed by: PSYCHIATRY & NEUROLOGY

## 2025-07-07 PROCEDURE — 3079F DIAST BP 80-89 MM HG: CPT | Performed by: PSYCHIATRY & NEUROLOGY

## 2025-07-07 PROCEDURE — 3077F SYST BP >= 140 MM HG: CPT | Performed by: PSYCHIATRY & NEUROLOGY

## 2025-07-07 PROCEDURE — 1159F MED LIST DOCD IN RCRD: CPT | Performed by: PSYCHIATRY & NEUROLOGY

## 2025-07-07 RX ORDER — LEVETIRACETAM 500 MG/1
500 TABLET ORAL 2 TIMES DAILY
Qty: 180 TABLET | Refills: 0 | Status: SHIPPED | OUTPATIENT
Start: 2025-07-07

## 2025-07-07 NOTE — PROGRESS NOTES
"Chief Complaint  Seizures (No new sz)    Subjective          Aracelis Arechiga presents to [facility] NEUROLOGY for   HISTORY OF PRESENT ILLNESS:    Aracelis Arechiga is a 69 year old right handed woman who returns to neurology clinic for follow up evaluation and hospital follow up for spells concerning for possible seizures/TGA and abnormal EEG concerning for focal epilepsy.  Of note she has diagnosis of hypertension, mixed hyperlipidemia and migraine headaches (has not had any migraines in several years) and presented to the hospital on 12/7/2024 for transient memory loss.  She had gone to [employer] for [employer] and she does not remember much of the drive there and she was in the parking lot and she went into [employer] and then experienced loss of memory and she even played the keyboard for them to sing and she does not remember and when she got home she does not remember the drive home.  When she returned home her  asked how did it go and she does not remember them singing and he started asking questions about his family members if they were living or had passed away.  She had the same symptoms 20 years ago following suicide of her niece and she thought it may have been related to trauma or stress of these symptoms.  She had some similar traumatic experiences with her family months ago prior to this most recent spell.  She had a negative CTA of her head and neck and a brain MRI scan which demonstrates chronic white matter changes and no acute intracranial abnormalities.  She had an EEG done in the hospital which was read as \"Abnormal EEG during wakefulness and stage II sleep showing left frontotemporal slowing consistent with a structural lesion.  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 "  She denies any side effects on the medicine other than feeling tired and not wanting to do as much activities but reports this has improved and may have been related to back issues at that time but denies any suicidal ideation/homicidal ideation or being depressed.  She does report having some increased joint pain but again not sure if this is truly related to levetiracetam or degenerative changes in her joints and she is going to follow up with orthopedic surgery again as well.  She has not had any further seizure like activity or similar spells and has returned to driving without any issues.  She denies any family history of seizures.  No history of meningitis or encephalitis.  She does report a history of head trauma in the past.  Overall she is doing well.      Past Medical History:   Diagnosis Date    Arthritis     Disease of thyroid gland     Hyperlipidemia     Hypertension     Insomnia     Migraines         Family History   Problem Relation Age of Onset    Macular degeneration Mother     Heart failure Father     Stroke Father             Cancer Father         Skin cancers    Hypertension Father     Kidney disease Father     Cancer Maternal Grandmother         Ovarian cancer metastasized to colon    Cancer Brother         Prostate cancer        Social History     Socioeconomic History    Marital status:    Tobacco Use    Smoking status: Former     Current packs/day: 0.00     Types: Cigarettes     Quit date: 2000     Years since quittin.6    Smokeless tobacco: Never    Tobacco comments:     No smoker for 20 years    Vaping Use    Vaping status: Never Used   Substance and Sexual Activity    Alcohol use: No    Drug use: No    Sexual activity: Not Currently     Partners: Male     Birth control/protection: Post-menopausal        I have reviewed and confirmed the accuracy of the ROS as documented by the MA/LPBARTOLO/RN Fede Joy MD   Review of Systems   Neurological:  Negative for  "dizziness, tremors, seizures, syncope, facial asymmetry, speech difficulty, weakness, light-headedness, numbness, headache, memory problem and confusion.   Psychiatric/Behavioral:  Negative for agitation, behavioral problems, decreased concentration, dysphoric mood, hallucinations, self-injury, sleep disturbance, suicidal ideas, negative for hyperactivity, depressed mood and stress. The patient is not nervous/anxious.         Objective   Vital Signs:   /82   Pulse 55   Ht 152.4 cm (60\")   Wt 68 kg (150 lb)   SpO2 96%   BMI 29.29 kg/m²       PHYSICAL EXAM:    General   Mental Status - Alert. General Appearance - Well developed, Well groomed, Oriented and Cooperative. Orientation - Oriented X3.       Head and Neck  Head - normocephalic, atraumatic with no lesions or palpable masses.  Neck    Global Assessment - supple.       Eye   Sclera/Conjunctiva - Bilateral - Normal.    ENMT  Mouth and Throat   Oral Cavity/Oropharynx: Oropharynx - the soft palate,uvula and tongue are normal in appearance.    Chest and Lung Exam   Chest - lung clear to auscultation bilaterally.    Cardiovascular   Cardiovascular examination reveals  - normal heart sounds, regular rate and rhythm.    Neurologic   Mental Status: Speech - Normal. Cognitive function - appropriate fund of knowledge. No impairment of attention, Impairment of concentration, impairment of long term memory or impairment of short term memory.  Cranial Nerves:   II Optic: Visual acuity - Left - Normal. Right - Normal. Visual fields - Normal (to confrontation).  III Oculomotor: Pupillary constriction - Left - Normal. Right - Normal.  VII Facial: - Normal Bilaterally.   IX Glossopharyngeal / X Vagus - Normal.  XI Accessory: Trapezius - Bilateral - Normal. Sternocleidomastoid - Bilateral - Normal.  XII Hypoglossal - Bilateral - Normal.  Eye Movements: - Normal Bilaterally.  Sensory:   Light Touch: Intact - Globally.  Motor:   Bulk and Contour: - Normal.  Tone: - " "Normal.  Tremor: Very mild head bobbing tremor.  Strength: 5/5 normal muscle strength - All Muscles.   General Assessment of Reflexes: - deep tendon reflexes are normal. Coordination - No Impairment of finger-to-nose or Impairment of rapid alternating movements. Gait - Normal.         Result Review :                 Assessment and Plan    Problem List Items Addressed This Visit       TGA (transient global amnesia) - Primary    Focal epilepsy    Current Assessment & Plan   69 year old right handed woman with spells concerning for possible seizures/TGA and abnormal EEG concerning for focal epilepsy.  Of note she has diagnosis of hypertension, mixed hyperlipidemia and migraine headaches (has not had any migraines in several years) and presented to the hospital on 12/7/2024 for transient memory loss.  She had gone to [employer] for [employer] and she does not remember much of the drive there and she was in the parking lot and she went into [employer] and then experienced loss of memory and she even played the keyboard for them to sing and she does not remember and when she got home she does not remember the drive home.  When she returned home her  asked how did it go and she does not remember them singing and he started asking questions about his family members if they were living or had passed away.  She had the same symptoms 20 years ago following suicide of her niece and she thought it may have been related to trauma or stress of these symptoms.  She had some similar traumatic experiences with her family months ago prior to this most recent spell.  She had a negative CTA of her head and neck and a brain MRI scan which demonstrates chronic white matter changes and no acute intracranial abnormalities.  She had an EEG done in the hospital which was read as \"Abnormal EEG during wakefulness and stage II sleep showing left frontotemporal slowing consistent with a structural lesion.  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  She denies any side effects on the medicine other than feeling tired and not wanting to do as much activities but reports this has improved and may have been related to back issues at that time but denies any suicidal ideation/homicidal ideation or being depressed.  She does report having some increased joint pain but again not sure if this is truly related to levetiracetam or degenerative changes in her joints and she is going to follow up with orthopedic surgery again as well.  She has not had any further seizure like activity or similar spells and has returned to driving without any issues.  She denies any family history of seizures.  No history of meningitis or encephalitis.  She does report a history of head trauma in the past.  Overall she is doing well.  I spoke with her with regards to potential side effects of levetiracetam and very low possibility of it causing joint pain (seen in children and adolescents 2%) and she opted not to change medications at this time as she will be working with her orthopedic surgeon to further evaluate her joint pain.  I advised her to take her medication as prescribed without missing any doses as seizures can be potentially fatal.  Discussed seizure precautions.                 Follow Up   Return in about 3 months (around 10/7/2025).  Patient was given instructions and counseling regarding her condition or for health maintenance advice. Please see specific information pulled into the AVS if appropriate.     "

## 2025-07-07 NOTE — ASSESSMENT & PLAN NOTE
"69 year old right handed woman with spells concerning for possible seizures/TGA and abnormal EEG concerning for focal epilepsy.  Of note she has diagnosis of hypertension, mixed hyperlipidemia and migraine headaches (has not had any migraines in several years) and presented to the hospital on 12/7/2024 for transient memory loss.  She had gone to [facility] for [employer] and she does not remember much of the drive there and she was in the parking lot and she went into [facility] and then experienced loss of memory and she even played the keyboard for them to sing and she does not remember and when she got home she does not remember the drive home.  When she returned home her  asked how did it go and she does not remember them singing and he started asking questions about his family members if they were living or had passed away.  She had the same symptoms 20 years ago following suicide of her niece and she thought it may have been related to trauma or stress of these symptoms.  She had some similar traumatic experiences with her family months ago prior to this most recent spell.  She had a negative CTA of her head and neck and a brain MRI scan which demonstrates chronic white matter changes and no acute intracranial abnormalities.  She had an EEG done in the hospital which was read as \"Abnormal EEG during wakefulness and stage II sleep showing left frontotemporal slowing consistent with a structural lesion.  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  She denies any side effects on the medicine other than feeling tired and not wanting to do as much activities but reports this has improved and may have been related to back issues at that time but denies any suicidal ideation/homicidal ideation or being " depressed.  She does report having some increased joint pain but again not sure if this is truly related to levetiracetam or degenerative changes in her joints and she is going to follow up with orthopedic surgery again as well.  She has not had any further seizure like activity or similar spells and has returned to driving without any issues.  She denies any family history of seizures.  No history of meningitis or encephalitis.  She does report a history of head trauma in the past.  Overall she is doing well.  I spoke with her with regards to potential side effects of levetiracetam and very low possibility of it causing joint pain (seen in children and adolescents 2%) and she opted not to change medications at this time as she will be working with her orthopedic surgeon to further evaluate her joint pain.  I advised her to take her medication as prescribed without missing any doses as seizures can be potentially fatal.  Discussed seizure precautions.

## 2025-07-07 NOTE — PATIENT INSTRUCTIONS
In general, we recommend using good judgement when you are doing certain activities and to avoid those activities that if you were to have a seizure, you could harm yourself or others. In the state of [place], it is the law that you cannot drive within 90 days of a seizure. We also recommend not standing over open flames, not getting on high ladders or the roof, not swimming or taking baths by yourself (showers are ok) and not operating heavy machinery or power tools.  [facility]  Fede Joy MD  Neurology clinic  [phone]    With anti-seizure medications, you may initially notice side effects of fatigue, drowsiness, unsteadiness, and dizziness.  Other possible side effects include nausea, abdominal pain, headache, blurry or double vision, slurred speech and mood changes.  Generally, patients will noticed these symptoms when the medication is first started or with higher doses and will go away with time.    It is import to consistently take your medication every day.  Missing just one dose may put you at risk for a breakthrough seizure.  Consider using reminders on your phone or a pill box.    If you develop a rash, please call the neurology clinic immediately or notify another healthcare professional, as this may be potentially life-threatening.  If you are unable to reach a healthcare professional, go to the emergency room immediately for further evaluation.    If you develop thoughts of wanting to hurt yourself or others, please call the neurology clinic immediately to notify another healthcare professional.  If you are unable to reach a healthcare professional, go to the emergency room immediately for further evaluation.    It is the [place] state law that you cannot drive within 90 days of a seizure.    You should avoid certain activities that if you were to have a seizure, you could harm yourself or others. In general, it is recommended that you avoid operating heavy machinery or  power tools, swimming or taking baths by yourself (showers are ok), don't stand over open flames, don't get on high ladders or the roof.  I also recommend to avoid sleeping on your stomach.    For further information on epilepsy and resources available to patients and their families, please visit the [facility] at [email or web address] or call [phone].    **Check out the [facility]'s monthly Art Group Gathering.  [facility] [address]  Call Jayne Adams at [phone] or email her at [email or web address] for the dates of future gatherings.**      **If you have having memory problems, consider [facility] (Home-Based Self-management and Cognitive Training Changes lives).  It is an 8 week self-management program for adults with epilepsy and memory problems.  The program is free at the [facility].  Contact Beatrice Jimenez at [phone] or [email or web address].**

## 2025-07-22 ENCOUNTER — TELEPHONE (OUTPATIENT)
Dept: NEUROLOGY | Facility: CLINIC | Age: 70
End: 2025-07-22
Payer: MEDICARE